# Patient Record
Sex: FEMALE | Race: NATIVE HAWAIIAN OR OTHER PACIFIC ISLANDER | HISPANIC OR LATINO | Employment: UNEMPLOYED | URBAN - METROPOLITAN AREA
[De-identification: names, ages, dates, MRNs, and addresses within clinical notes are randomized per-mention and may not be internally consistent; named-entity substitution may affect disease eponyms.]

---

## 2017-06-24 ENCOUNTER — APPOINTMENT (OUTPATIENT)
Dept: RADIOLOGY | Facility: MEDICAL CENTER | Age: 47
End: 2017-06-24
Attending: EMERGENCY MEDICINE
Payer: OTHER MISCELLANEOUS

## 2017-06-24 ENCOUNTER — HOSPITAL ENCOUNTER (EMERGENCY)
Facility: MEDICAL CENTER | Age: 47
End: 2017-06-24
Attending: EMERGENCY MEDICINE
Payer: OTHER MISCELLANEOUS

## 2017-06-24 VITALS
HEART RATE: 90 BPM | RESPIRATION RATE: 16 BRPM | SYSTOLIC BLOOD PRESSURE: 187 MMHG | TEMPERATURE: 98.2 F | DIASTOLIC BLOOD PRESSURE: 101 MMHG | HEIGHT: 63 IN | BODY MASS INDEX: 44.3 KG/M2 | WEIGHT: 250 LBS | OXYGEN SATURATION: 96 %

## 2017-06-24 DIAGNOSIS — T14.90XA BLUNT TRAUMA: ICD-10-CM

## 2017-06-24 DIAGNOSIS — N28.89 RENAL MASS: ICD-10-CM

## 2017-06-24 DIAGNOSIS — S90.01XA CONTUSION OF RIGHT ANKLE, INITIAL ENCOUNTER: ICD-10-CM

## 2017-06-24 DIAGNOSIS — S29.9XXA CHEST WALL TRAUMA, INITIAL ENCOUNTER: ICD-10-CM

## 2017-06-24 LAB
ABO GROUP BLD: NORMAL
ALBUMIN SERPL BCP-MCNC: 3.6 G/DL (ref 3.2–4.9)
ALBUMIN/GLOB SERPL: 0.9 G/DL
ALP SERPL-CCNC: 112 U/L (ref 30–99)
ALT SERPL-CCNC: 25 U/L (ref 2–50)
ANION GAP SERPL CALC-SCNC: 8 MMOL/L (ref 0–11.9)
AST SERPL-CCNC: 19 U/L (ref 12–45)
BILIRUB SERPL-MCNC: 0.4 MG/DL (ref 0.1–1.5)
BLD GP AB SCN SERPL QL: NORMAL
BUN SERPL-MCNC: 10 MG/DL (ref 8–22)
CALCIUM SERPL-MCNC: 8.9 MG/DL (ref 8.5–10.5)
CHLORIDE SERPL-SCNC: 105 MMOL/L (ref 96–112)
CO2 SERPL-SCNC: 21 MMOL/L (ref 20–33)
CREAT SERPL-MCNC: 0.64 MG/DL (ref 0.5–1.4)
ERYTHROCYTE [DISTWIDTH] IN BLOOD BY AUTOMATED COUNT: 43.6 FL (ref 35.9–50)
ETHANOL BLD-MCNC: 0 G/DL
GFR SERPL CREATININE-BSD FRML MDRD: >60 ML/MIN/1.73 M 2
GLOBULIN SER CALC-MCNC: 4 G/DL (ref 1.9–3.5)
GLUCOSE SERPL-MCNC: 189 MG/DL (ref 65–99)
HCG SERPL QL: NEGATIVE
HCT VFR BLD AUTO: 31.7 % (ref 37–47)
HGB BLD-MCNC: 9.7 G/DL (ref 12–16)
MCH RBC QN AUTO: 23.9 PG (ref 27–33)
MCHC RBC AUTO-ENTMCNC: 30.6 G/DL (ref 33.6–35)
MCV RBC AUTO: 78.1 FL (ref 81.4–97.8)
PLATELET # BLD AUTO: 274 K/UL (ref 164–446)
PMV BLD AUTO: 8.8 FL (ref 9–12.9)
POTASSIUM SERPL-SCNC: 3.9 MMOL/L (ref 3.6–5.5)
PROT SERPL-MCNC: 7.6 G/DL (ref 6–8.2)
RBC # BLD AUTO: 4.06 M/UL (ref 4.2–5.4)
RH BLD: NORMAL
SODIUM SERPL-SCNC: 134 MMOL/L (ref 135–145)
WBC # BLD AUTO: 6.7 K/UL (ref 4.8–10.8)

## 2017-06-24 PROCEDURE — 80307 DRUG TEST PRSMV CHEM ANLYZR: CPT

## 2017-06-24 PROCEDURE — 96374 THER/PROPH/DIAG INJ IV PUSH: CPT

## 2017-06-24 PROCEDURE — 86850 RBC ANTIBODY SCREEN: CPT

## 2017-06-24 PROCEDURE — 305948 HCHG GREEN TRAUMA ACT PRE-NOTIFY NO CC

## 2017-06-24 PROCEDURE — 72125 CT NECK SPINE W/O DYE: CPT

## 2017-06-24 PROCEDURE — 80053 COMPREHEN METABOLIC PANEL: CPT

## 2017-06-24 PROCEDURE — 700111 HCHG RX REV CODE 636 W/ 250 OVERRIDE (IP): Performed by: PHYSICIAN ASSISTANT

## 2017-06-24 PROCEDURE — 99285 EMERGENCY DEPT VISIT HI MDM: CPT

## 2017-06-24 PROCEDURE — 85027 COMPLETE CBC AUTOMATED: CPT

## 2017-06-24 PROCEDURE — 96375 TX/PRO/DX INJ NEW DRUG ADDON: CPT

## 2017-06-24 PROCEDURE — 70450 CT HEAD/BRAIN W/O DYE: CPT

## 2017-06-24 PROCEDURE — 72128 CT CHEST SPINE W/O DYE: CPT

## 2017-06-24 PROCEDURE — 73600 X-RAY EXAM OF ANKLE: CPT | Mod: RT

## 2017-06-24 PROCEDURE — 86900 BLOOD TYPING SEROLOGIC ABO: CPT

## 2017-06-24 PROCEDURE — 84703 CHORIONIC GONADOTROPIN ASSAY: CPT

## 2017-06-24 PROCEDURE — 71260 CT THORAX DX C+: CPT

## 2017-06-24 PROCEDURE — 86901 BLOOD TYPING SEROLOGIC RH(D): CPT

## 2017-06-24 PROCEDURE — 700117 HCHG RX CONTRAST REV CODE 255: Performed by: EMERGENCY MEDICINE

## 2017-06-24 PROCEDURE — 72131 CT LUMBAR SPINE W/O DYE: CPT

## 2017-06-24 PROCEDURE — 71010 DX-CHEST-LIMITED (1 VIEW): CPT

## 2017-06-24 RX ORDER — MORPHINE SULFATE 4 MG/ML
INJECTION, SOLUTION INTRAMUSCULAR; INTRAVENOUS
Status: COMPLETED | OUTPATIENT
Start: 2017-06-24 | End: 2017-06-24

## 2017-06-24 RX ORDER — ONDANSETRON 2 MG/ML
INJECTION INTRAMUSCULAR; INTRAVENOUS
Status: COMPLETED | OUTPATIENT
Start: 2017-06-24 | End: 2017-06-24

## 2017-06-24 RX ADMIN — MORPHINE SULFATE 4 MG: 4 INJECTION INTRAVENOUS at 11:16

## 2017-06-24 RX ADMIN — ONDANSETRON 4 MG: 2 INJECTION INTRAMUSCULAR; INTRAVENOUS at 11:17

## 2017-06-24 RX ADMIN — IOHEXOL 100 ML: 350 INJECTION, SOLUTION INTRAVENOUS at 11:49

## 2017-06-24 ASSESSMENT — PAIN SCALES - GENERAL: PAINLEVEL_OUTOF10: 3

## 2017-06-24 NOTE — ED AVS SNAPSHOT
Utilize Health Access Code: [email or web address]  Expires: 7/24/2017 12:38 PM    Your email address is not on file at [email or web address].  Email Addresses are required for you to sign up for Utilize Health, please contact [phone] to verify your personal information and to provide your email address prior to attempting to register for Utilize Health.    [facility]  [address] [place] [address]    [employer]  A secure, online tool to manage your health information     [email or web address]’s Utilize Health® is a secure, online tool that connects you to your personalized health information from the privacy of your home -- day or night - making it very easy for you to manage your healthcare. Once the activation process is completed, you can even access your medical information using the Utilize Health jose r, which is available for free in the Apple Jose R store or Google Play store.     To learn more about Utilize Health, visit [email or web address]    There are two levels of access available (as shown below):   My Chart Features  [facility] Primary Care Doctor [facility]  Specialists [facility]  Urgent  Care Non-[facility] Primary Care Doctor   Email your healthcare team securely and privately 24/7 X X X    Manage appointments: schedule your next appointment; view details of past/upcoming appointments X      Request prescription refills. X      View recent personal medical records, including lab and immunizations X X X X   View health record, including health history, allergies, medications X X X X   Read reports about your outpatient visits, procedures, consult and ER notes X X X X   See your discharge summary, which is a recap of your hospital and/or ER visit that includes your diagnosis, lab results, and care plan X X  X     How to register for [employer]:  Once your e-mail address has been verified, follow the following steps to sign up for Utilize Health.     1. Go to  [email or web address]  2. Click on the Sign Up Now box, which takes you to the New Member Sign Up page. You will need  to provide the following information:  a. Enter your [employer] Access Code exactly as it appears at the top of this page. (You will not need to use this code after you’ve completed the sign-up process. If you do not sign up before the expiration date, you must request a new code.)   b. Enter your date of birth.   c. Enter your home email address.   d. Click Submit, and follow the next screen’s instructions.  3. Create a [employer] ID. This will be your [employer] login ID and cannot be changed, so think of one that is secure and easy to remember.  4. Create a [employer] password. You can change your password at any time.  5. Enter your Password Reset Question and Answer. This can be used at a later time if you forget your password.   6. Enter your e-mail address. This allows you to receive e-mail notifications when new information is available in [employer].  7. Click Sign Up. You can now view your health information.    For assistance activating your [employer] account, call [phone]
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
## 2017-06-24 NOTE — ED NOTES
Spinal precautions discontinued per ERP.  C-collar removed.  Patient repositioned for comfort.  Family remains at bedside.  Updated on POC.
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
## 2017-06-24 NOTE — DISCHARGE INSTRUCTIONS
You'll need to follow up with urology for further evaluation and management of your kidney mass as this may be cancer.    Masa renal  (Renal Mass)  Tarik masa renal es un tumor en el riñón. Algunas masas tumorales son dañinas y pueden provocar cáncer. Otras son inofensivas. Tarik masa renal puede ser sólida o estar llena de líquido. Las masas tumorales que están llenas de líquido se denominan quistes.  CAUSAS  Por lo general, se desconoce la causa de tarik masa renal. Sin embargo, ciertos tipos de cáncer e infección pueden originar tarik masa renal.   SIGNOS Y SÍNTOMAS  Entre los síntomas se pueden incluir los siguientes:  · Chon en la orina.  · Dolor en el costado del cuerpo o en la espalda (dolor en la fosa lumbar).  · Sensación de saciedad inmediatamente después de comer.  · Pérdida de peso.  · Hinchazón del abdomen.  Algunas masas renales no causan síntomas.  DIAGNÓSTICO  Tarik masa renal puede detectarse con tarik TC, tarik ecografía o tarik RM del abdomen.   TRATAMIENTO  El tratamiento dependerá del tipo de masa renal.  · Si la masa renal es un quiste que no causa problemas, no requerirá tratamiento.  · Si la masa renal es un quiste que causa problemas, es posible que deba drenarse hans un procedimiento denominado cirugía laparoscópica.  · Si la masa renal es sólida, es posible que deba extirparse mediante tarik cirugía de abdomen.  · Si la masa renal es causada por un cáncer de riñón, es posible que se requiera tarik cirugía para extirpar todo o parte del riñón.  Debe consultar a cox médico tarik o dos veces al año para que le realicen tomografías computarizadas o ecografías. En estos estudios, cox médico podrá jose si la masa renal se ha modificado o agrandado.  INSTRUCCIONES PARA EL CUIDADO EN EL HOGAR  Lo que deba hacer en el hogar dependerá del tipo de masa renal que tenga. El tratamiento que realizó también establecerá tarik diferencia. Siga las indicaciones del médico. En general:  · Concurra a todas las visitas de control  jaison se lo haya indicado el médico.  · Curtiss los medicamentos solamente jaison se lo haya indicado el médico.  SOLICITE ATENCIÓN MÉDICA SI:  · Siente dolor abdominal.  · Siente dolor en la fosa lumbar.  · Tiene fiebre.  SOLICITE ATENCIÓN MÉDICA DE INMEDIATO SI:   · El dolor empeora.  · Hay nasreen en la orina.  · No puede orinar.  · Siente dolor en el pecho.  · Tiene dificultad para respirar.  ASEGÚRESE DE QUE:  · Comprende estas instrucciones.  · Controlará cox afección.  · Recibirá ayuda de inmediato si no mejora o si empeora.     Esta información no tiene jaison fin reemplazar el consejo del médico. Asegúrese de hacerle al médico cualquier pregunta que tenga.     Document Released: 07/15/2015  [employer] Interactive Patient Education ©2016 [employer]  Traumatismo contuso  (Blunt Trauma)  Usted ha sido evaluado por carlene lesiones. Fue examinado y el profesional que lo asiste no encontró lesiones lo suficientemente graves jaison para que requiera hospitalización.  Luego de un accidente, es común presentar múltiples magullones y burton musculares. Estos tienden a aumentar hans las primeras 24 horas, con más rigidez y dolor en las horas siguientes, que empeorarán cuando se levante de la cama la primera mañana luego del accidente. A partir de allí, debería comenzar a mejorar cada día que pase. El nivel de mejoría generalmente depende de la importancia de las lesiones sufridas en el accidente.  Luego del accidente, si alguna parte de cox cuerpo no responde jaison debería, o si el dolor en alguna jeannie se incrementa, debe regresar al Servicio de Emergencias para que lo examinen nuevamente.   INSTRUCCIONES PARA EL CUIDADO DOMICILIARIO  Los cuidados de rutina para las zonas doloridas deben incluir:  · Hielo en las zonas doloridas cada 2 horas hans 20 minutos mientras está despierto hans los próximos 2 días.  · Beber líquidos en abundancia (excluya el alcohol).  · Darse tarik ducha caliente o tibia o deepak un baño tarik o dos  veces por día para aumentar el flujo sanguíneo en los músculos doloridos. [place] lo ayudará a recuperar la agilidad.  · Actividades según las tolere. Levantar pesos puede agravar el dolor de richard o espalda.  · Utilice los medicamentos de venta amber o de prescripción para el dolor, el malestar o la fiebre, según se lo indique el profesional que lo asiste. No tome aspirina. Podrían aumentar los hematomas o las hemorragias en tee de que existan pequeñas zonas en las que esto ocurre.  SOLICITE ATENCIÓN MÉDICA DE INMEDIATO SI OBSERVA:  · Entumecimiento, hormigueo, debilidad o problemas con el uso de los brazos o las piernas.  · Dolor de anabela intenso que no mejora con medicamentos.  · Cambios en el control del intestino o la vejiga.  · Aumento del dolor en otras partes del cuerpo.  · Dificultades respiratorias, mareos.  · Náuseas, vómitos o sudoración.  · Aumento del malestar abdominal (en el vientre).  · Nasreen en la orina, en las heces o vómitos con nasreen.  · Dolor en los hombros en el área donde se ubicarían los tirantes de tarik prenda.  · Sensación de mareos o si ha sufrido un episodio de desmayo.  En algunos casos no es posible identificar todas las lesiones inmediatamente después del traumatismo. Es importante que siga controlando cox enfermedad después de la visita al servicio de emergencias. Si siente que no mejora, o mejora más lentamente que lo que debería esperarse, llame a cox médico. Si siente que carlene síntomas (problemas) empeoran, vuelva al Servicio de Emergencias inmediatamente.  Document Released: 12/18/2006 Document Revised: 03/11/2013  ExitCare® Patient Information ©2014 [employer], [employer].  Esguince de tobillo  (Ankle Sprain)   Un esguince de tobillo es tarik lesión en los tejidos priscila y fibrosos (ligamentos) que mantienen unidos los huesos de la articulación del tobillo.   CAUSAS   Las causas pueden ser tarik caída o la torcedura del tobillo. Los esguinces de tobillo ocurren con más frecuencia al pisar con  el borde exterior del pie, lo que hace que el tobillo se vuelva hacia adentro. Las personas que practican deportes son más propensas a belen tipo de lesiones.   SÍNTOMAS   · Dolor en el tobillo. El dolor puede aparecer hans el reposo o sólo al tratar de ponerse de pie o caminar.  · Hinchazón.  · Hematomas. Los hematomas pueden aparecer inmediatamente o luego de 1 a 2 días después de la lesión.  · Dificultad para pararse o caminar, especialmente al doblar en esquinas o al cambiar de dirección.  DIAGNÓSTICO   El médico le preguntará detalles acerca de la lesión y le hará un examen físico del tobillo para determinar si tiene un esguince. Hans el examen físico, el médico apretará y aplicará presión en áreas específicas del pie y del tobillo. El médico tratará de  el tobillo en ciertas direcciones. Le indicarán tarik radiografía para descartar la fractura de un hueso o que un ligamento no se haya separado de michelle de los huesos del tobillo (fractura por avulsión).   TRATAMIENTO   Algunos tipos de soporte podrán ayudarlo a estabilizar el tobillo. El profesional que lo asiste le dará las indicaciones. También podrá indicarle que use medicamentos para calmar el dolor. Si el esguince es grave, cox médico podrá derivarlo a un cirujano que lo ayudará a recuperar la función de las partes afectadas del sistema esquelético (ortopedista) o a un fisioterapeuta.   INSTRUCCIONES PARA EL CUIDADO EN EL HOGAR   · Aplique hielo en la articulación lesionada hans 1 ó 2 días o según lo que le indique cox médico. La aplicación del hielo ayuda a reducir la inflamación y el dolor.  ¨ Ponga el hielo en tarik bolsa plástica.  ¨ Colóquese tarik toalla entre la piel y la bolsa de hielo.  ¨ Deje el hielo en el lugar hans 15 a 20 minutos por vez, cada 2 horas mientras esté despierto.  · Sólo tome medicamentos de venta amber o recetados para calmar el dolor, las molestias o bajar la fiebre según las indicaciones de cox médico.  · Eleve el  tobillo lesionado por encima del nivel del corazón tanto jaison pueda hans 2 o 3 días.  · Si cox médico le indica el uso de muletas, úselas según las instrucciones. Gradualmente lleve el peso sobre el tobillo afectado. Siga usando muletas o un bastón hasta que pueda caminar sin sentir dolor en el tobillo.  · Si tiene tarik férula de yeso, úsela jaison lo indique cox médico. No se apoye en ninguna cosa más dura que tarik almohada hans las primeras 24 horas. No ponga peso sobre la férula. No permita que se moje. Puede quitársela para deepak tarik ducha o un baño.  · Pueden haberle colocado un vendaje elástico para usar alrededor del tobillo para darle soporte. Si el vendaje elástico está muy ajustado (siente adormecimiento u hormigueo o el pie está frío y heather), ajústelo para que sea más cómodo.  · Si usted tiene tarik férula de aire, puede soplar o dejar salir el aire para que sea más cómodo. Puede quitarse la férula por la noche y antes de deepak tarik ducha o un baño. Mueva los dedos de los pies en la férula varias veces al día para disminuir la hinchazón.  SOLICITE ATENCIÓN MÉDICA SI:   · Le aumenta rápidamente el moretón o el hinchazón.  · Los dedos de los pies están extremadamente fríos o pierde la sensibilidad en el pie.  · El dolor no se joss con los medicamentos.  SOLICITE ATENCIÓN MÉDICA DE INMEDIATO SI:   · Los dedos de los pies están adormecidos o de color heather.  · Tiene un dolor dior que va aumentando.  ASEGÚRESE DE QUE:   · Comprende estas instrucciones.  · Controlará cox enfermedad.  · Solicitará ayuda de inmediato si no mejora o empeora.     Esta información no tiene jaison fin reemplazar el consejo del médico. Asegúrese de hacerle al médico cualquier pregunta que tenga.     Document Released: 12/18/2006 Document Revised: 09/11/2013  Elsevier Interactive Patient Education ©2016 [employer]vier Inc.

## 2017-06-24 NOTE — ED AVS SNAPSHOT
6/24/2017    Every Eleven  [address] [place] [address]    Dear Every:    [facility] wants to ensure your discharge home is safe and you or your loved ones have had all of your questions answered regarding your care after you leave the hospital.    Below is a list of resources and contact information should you have any questions regarding your hospital stay, follow-up instructions, or active medical symptoms.    Questions or Concerns Regarding… Contact   Medical Questions Related to Your Discharge  (7 days a week, 8am-5pm) Contact a Nurse Care Coordinator   [phone]   Medical Questions Not Related to Your Discharge  (24 hours a day / 7 days a week)  Contact the Nurse Health Line   [phone]    Medications or Discharge Instructions Refer to your discharge packet   or contact your [facility] Primary Care Provider   [phone]   Follow-up Appointment(s) Schedule your appointment via On[employer]   or contact Scheduling [phone]   Billing Review your statement via Ondax  or contact Billing [phone]   Medical Records Review your records via Ondax   or contact Medical Records [phone]     You may receive a telephone call within two days of discharge. This call is to make certain you understand your discharge instructions and have the opportunity to have any questions answered. You can also easily access your medical information, test results and upcoming appointments via the Ondax free online health management tool. You can learn more and sign up at [employer]ndax. For assistance setting up your Ondax account, please call [phone].    Once again, we want to ensure your discharge home is safe and that you have a clear understanding of any next steps in your care. If you have any questions or concerns, please do not hesitate to contact us, we are here for you. Thank you for choosing [facility] for your healthcare needs.    Sincerely,    Your [facility] Healthcare Team

## 2017-06-24 NOTE — ED NOTES
ERP at bedside for re-eval.  Patient and family member given discharge instructions and follow up information, declined use of language line video , family translating.  PIV removed.

## 2017-11-15 ENCOUNTER — OFFICE VISIT (OUTPATIENT)
Dept: INTERNAL MEDICINE | Facility: MEDICAL CENTER | Age: 47
End: 2017-11-15
Payer: COMMERCIAL

## 2017-11-15 VITALS
BODY MASS INDEX: 43.02 KG/M2 | HEART RATE: 92 BPM | SYSTOLIC BLOOD PRESSURE: 179 MMHG | TEMPERATURE: 97.6 F | HEIGHT: 64 IN | OXYGEN SATURATION: 96 % | WEIGHT: 252 LBS | DIASTOLIC BLOOD PRESSURE: 79 MMHG

## 2017-11-15 DIAGNOSIS — I10 HYPERTENSION, UNSPECIFIED TYPE: ICD-10-CM

## 2017-11-15 DIAGNOSIS — N28.89 RENAL MASS, RIGHT: ICD-10-CM

## 2017-11-15 DIAGNOSIS — Z00.00 HEALTH CARE MAINTENANCE: ICD-10-CM

## 2017-11-15 DIAGNOSIS — D64.9 ANEMIA, UNSPECIFIED TYPE: ICD-10-CM

## 2017-11-15 PROCEDURE — 90471 IMMUNIZATION ADMIN: CPT | Performed by: INTERNAL MEDICINE

## 2017-11-15 PROCEDURE — 99204 OFFICE O/P NEW MOD 45 MIN: CPT | Mod: 25,GC | Performed by: INTERNAL MEDICINE

## 2017-11-15 PROCEDURE — 90715 TDAP VACCINE 7 YRS/> IM: CPT | Performed by: INTERNAL MEDICINE

## 2017-11-15 RX ORDER — CHLORTHALIDONE 25 MG/1
25 TABLET ORAL DAILY
Qty: 30 TAB | Refills: 3 | Status: SHIPPED | OUTPATIENT
Start: 2017-11-15 | End: 2017-12-05 | Stop reason: SDUPTHER

## 2017-11-15 RX ORDER — CLONIDINE HYDROCHLORIDE 0.1 MG/1
0.1 TABLET ORAL 3 TIMES DAILY
Qty: 90 TAB | Refills: 3 | Status: SHIPPED | OUTPATIENT
Start: 2017-11-15 | End: 2018-01-10

## 2017-11-15 ASSESSMENT — ENCOUNTER SYMPTOMS
VOMITING: 0
PND: 0
INSOMNIA: 0
HEADACHES: 1
FALLS: 0
DIZZINESS: 0
SPEECH CHANGE: 0
LOSS OF CONSCIOUSNESS: 0
WEAKNESS: 0
DOUBLE VISION: 0
NAUSEA: 0
HEMOPTYSIS: 0
BLURRED VISION: 1
WEIGHT LOSS: 0
TREMORS: 0
SORE THROAT: 0
ABDOMINAL PAIN: 0
SPUTUM PRODUCTION: 0
DEPRESSION: 0
HEARTBURN: 0
FEVER: 0
DIARRHEA: 0
NERVOUS/ANXIOUS: 1
PHOTOPHOBIA: 0
BRUISES/BLEEDS EASILY: 0
WHEEZING: 0
CHILLS: 0
SENSORY CHANGE: 0
EYE REDNESS: 0
EYE PAIN: 0
EYE DISCHARGE: 0
CLAUDICATION: 0
ORTHOPNEA: 0
BACK PAIN: 0
POLYDIPSIA: 0
FOCAL WEAKNESS: 0
FLANK PAIN: 0
STRIDOR: 0
PALPITATIONS: 0
DIAPHORESIS: 0
CONSTIPATION: 0
MYALGIAS: 0
NECK PAIN: 1
SHORTNESS OF BREATH: 0
HALLUCINATIONS: 0
COUGH: 0
SEIZURES: 0
BLOOD IN STOOL: 0
TINGLING: 0

## 2017-11-15 ASSESSMENT — PATIENT HEALTH QUESTIONNAIRE - PHQ9
5. POOR APPETITE OR OVEREATING: 3 - NEARLY EVERY DAY
CLINICAL INTERPRETATION OF PHQ2 SCORE: 6
SUM OF ALL RESPONSES TO PHQ QUESTIONS 1-9: 12

## 2017-11-15 ASSESSMENT — LIFESTYLE VARIABLES: SUBSTANCE_ABUSE: 0

## 2017-11-15 NOTE — PROGRESS NOTES
"Opal Coto is a 47 y.o. female here for a non-provider visit for:   TDAP    Reason for immunization: Overdue/Provider Recommended  Immunization records indicate need for vaccine: Yes, confirmed with Epic  Minimum interval has been met for this vaccine: Yes  ABN completed: Not Indicated    Order and dose verified by: Junior DE LA CRUZ Dated  2/24/2015 was given to patient: Yes  All IAC Questionnaire questions were answered \"No.\"    Patient tolerated injection and no adverse effects were observed or reported: Yes    Pt scheduled for next dose in series: Not Indicated    "
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
## 2017-11-15 NOTE — PATIENT INSTRUCTIONS
- please call Urology to schedule appointment  - please record the blood pressure 2 times a day    - please do the labs   - please  the new meds from the pharmacy   - follow up within 2 weeks  Anemia inespecífica  (Anemia, Nonspecific)  La anemia es tarik enfermedad en la que la concentración de glóbulos rojos o el nivel de hemoglobina en la nasreen están por debajo de lo normal. La hemoglobina es la sustancia de los glóbulos rojos que lleva el oxígeno a todo el cuerpo. La anemia da jaison resultado que los tejidos no reciban la cantidad suficiente de oxígeno.   CAUSAS   Las causas más frecuentes de anemia son:   · Sangrado excesivo. El sangrado puede ser interno o externo. Incluye sangrado excesivo debido al período (en las mujeres) o por los intestinos.    · Déficit nutricional.    · Enfermedad renal, tiroidea o hepática crónicas.   · Enfermedades de la médula ósea que disminuyen la producción de glóbulos rojos.  · Cáncer y tratamientos para el cáncer.  · VIH, sida y carlene tratamientos.  · Trastornos del bazo que aumentan la destrucción de glóbulos rojos.  · Enfermedades de la nasreen.  · Destrucción excesiva de glóbulos rojos debido a tarik infección, a medicamentos y a enfermedades autoinmunes.  SIGNOS Y SÍNTOMAS   · Debilidad leve.    · Mareos.    · Dolor de anabela.  · Palpitaciones.    · Falta de aire, especialmente con el ejercicio.    · Palidez.  · Sensibilidad al frío.  · Indigestión.  · Náuseas.  · Dificultad para dormir.  · Dificultad para concentrarse.  Los síntomas pueden ocurrir repentinamente o pueden desarrollarse lentamente.   DIAGNÓSTICO   Con frecuencia es necesario realizar análisis de nasreen adicionales. Estos ayudan al profesional a determinar el mejor tratamiento. Hartman médico controlará la materia fecal para detectar la presencia de nasreen y buscar otras causas de pérdida de nasreen.   TRATAMIENTO   El tratamiento varía según la causa de la anemia. Las opciones de tratamiento son:   · Suplementos de  lila, vitamina B12, o ácido fólico.    · Medicamentos con hormonas.    · Transfusión de nasreen. Será necesaria en los casos de pérdida de nasreen grave.    · Hospitalización. Será necesaria si la pérdida de nasreen es continua y significativa.    · Cambios en la dieta.  · Extirpación del bazo.  INSTRUCCIONES PARA EL CUIDADO EN EL HOGAR  Cumpla con todas las visitas de control. Generalmente demora varias semanas corregir la anemia, y es muy importante que el médico controle cox enfermedad y cox respuesta al tratamiento.  SOLICITE ATENCIÓN MÉDICA DE INMEDIATO SI:   · Siente debilidad extrema, falta de aire o dolor en el pecho.    · Se siente mareado o tiene dificultad para concentrarse.  · Tiene tarik hemorragia vaginal abundante.    · Aparece tarik erupción cutánea.    · La materia fecal es parul, de aspecto alquitranado.    · Se desmaya.    · Vomita nasreen.    · Vomita repetidas veces.    · Siente dolor abdominal.  · Tiene fiebre o síntomas persistentes hans más de 2 - 3 nikos.    · Tiene fiebre y los síntomas empeoran repentinamente.    · Se deshidrata.    ASEGÚRESE DE QUE:  · Comprende estas instrucciones.  · Controlará cox afección.  · Recibirá ayuda de inmediato si no mejora o si empeora.     Esta información no tiene jaison fin reemplazar el consejo del médico. Asegúrese de hacerle al médico cualquier pregunta que tenga.     Document Released: 12/18/2006 Document Revised: 08/20/2014  Elsevier Interactive Patient Education ©2016 [employer]    Hipertensión  (Hypertension)  Cuando el corazón late bombea la nasreen a través de las arterias. La fuerza que se origina es la presión arterial. Si la presión es demasiado elevada, se denomina hipertensión. El peligro radica en que puede sufrirla y no saberlo. Hipertensión puede significar que cox corazón debe trabajar más intensamente para bombear nasreen. Las arterias pueden estar estrechas o rígidas. El trabajo extra aumenta el riesgo de enfermedades cardíacas, ictus y otros  "problemas.   La presión arterial está formada por dos números: el número mayor sobre el número zeferino, por [id number]. Se señala \"110/72\". Los valores ideales son por debajo de 120 para el número más alto (sistólica) y por debajo de 80 para el más bajo (diastólica). Anote cox presión sanguínea hoy.  Debe prestar mucha atención a cox presión arterial si sufre alguna otra enfermedad jaison:  · Insuficiencia cardíaca  · Ataques cardiacos previos  · Diabetes  · Enfermedad renal crónica  · Ictus previo  · Múltiples factores de riesgo para enfermedades cardíacas  Para diagnosticar si usted sufre hipertensión arterial, debe medirse la presión mientras encuentra sentado con el brazo elevado a la altura del nivel del corazón. Debe medirse al menos 2 veces. Tarik única lectura de presión arterial elevada (especialmente en el servicio de emergencias) no significa que necesita tratamiento. Hay enfermedades en las que la presión arterial es diferente en ambos brazos. Es importante que consulte rápidamente con cox médico para un control.  La mayoría de las personas sufren hipertensión esencial, lo que significa que no tiene tarik causa específica. Flory tipo de hipertensión puede bajarse modificando algunos factores en el estilo de trinidad jaison:  · Estrés.  · El consumo de cigarrillos.  · La falta de actividad física.  · Peso excesivo  · Consumo de drogas y alcohol.  · Consumiendo menos sal  La mayoría de las personas no tienen síntomas hasta que la hipertensión ocasiona un daño en el organismo. El tratamiento efectivo puede evitar, demorar o reducir bre daño.  TRATAMIENTO:  El tratamiento para la hipertensión, cuando se ha identificado tarik causa, está dirigido a la misma Hay un gran número en medicamentos para tratarla. Se agrupan en diferentes categorías y el médico seleccionará los medicamentos indicados para usted. Muchos medicamentos disponibles tienen efectos secundarios. Debe comentar los efectos secundarios con cox médico.  Si la " presión arterial permanece elevada después de modificar cox estilo de trinidad o comenzar a lucía medicamentos:  · Los medicamentos deben ser reemplazados  · Puede ser necesario evaluar otros problemas.  · Debe estar seguro que comprende las indicaciones, que sabe cómo y cuándo lucía los medicamentos.  · Asegúrese de realizar un control con cox médico dentro del tiempo indicado (generalmente dentro de las dos semanas) para volver a evaluar la presión arterial y revisar los medicamentos prescritos.  · Si está tomando más de un medicamento para la presión arterial, asegúrese que sabe cómo y en qué momentos debe tomarlos. Lucía los medicamentos al mismo tiempo puede linda jaison resultado un gran descenso en la presión arterial.  SOLICITE ATENCIÓN MÉDICA DE INMEDIATO SI PRESENTA:  · Dolor de anabela intenso, visión borrosa o cambios en la visión, o confusión.  · Debilidad o adormecimientos inusuales o sensación de desmayo.  · Dolor de pecho o abdominal intenso, vómitos o problemas para respirar.  ASEGÚRESE QUE:   · Comprende estas instrucciones.  · Controlará cox enfermedad.  · Solicitará ayuda inmediatamente si no mejora o si empeora.  Document Released: 12/18/2006 Document Revised: 03/11/2013  [employer]® Patient Information ©2014 [employer].

## 2017-11-16 ENCOUNTER — HOSPITAL ENCOUNTER (OUTPATIENT)
Dept: LAB | Facility: MEDICAL CENTER | Age: 47
End: 2017-11-16
Attending: STUDENT IN AN ORGANIZED HEALTH CARE EDUCATION/TRAINING PROGRAM
Payer: COMMERCIAL

## 2017-11-16 DIAGNOSIS — N28.89 RENAL MASS, RIGHT: ICD-10-CM

## 2017-11-16 DIAGNOSIS — D64.9 ANEMIA, UNSPECIFIED TYPE: ICD-10-CM

## 2017-11-16 DIAGNOSIS — Z00.00 HEALTH CARE MAINTENANCE: ICD-10-CM

## 2017-11-16 DIAGNOSIS — I10 HYPERTENSION, UNSPECIFIED TYPE: ICD-10-CM

## 2017-11-16 LAB
25(OH)D3 SERPL-MCNC: 7 NG/ML (ref 30–100)
ALBUMIN SERPL BCP-MCNC: 3.6 G/DL (ref 3.2–4.9)
ALBUMIN/GLOB SERPL: 0.9 G/DL
ALP SERPL-CCNC: 103 U/L (ref 30–99)
ALT SERPL-CCNC: 23 U/L (ref 2–50)
ANION GAP SERPL CALC-SCNC: 8 MMOL/L (ref 0–11.9)
ANISOCYTOSIS BLD QL SMEAR: ABNORMAL
APPEARANCE UR: CLEAR
AST SERPL-CCNC: 22 U/L (ref 12–45)
BACTERIA #/AREA URNS HPF: ABNORMAL /HPF
BASOPHILS # BLD AUTO: 0.2 % (ref 0–1.8)
BASOPHILS # BLD: 0.01 K/UL (ref 0–0.12)
BILIRUB SERPL-MCNC: 0.4 MG/DL (ref 0.1–1.5)
BILIRUB UR QL STRIP.AUTO: NEGATIVE
BUN SERPL-MCNC: 10 MG/DL (ref 8–22)
CALCIUM SERPL-MCNC: 9.1 MG/DL (ref 8.5–10.5)
CHLORIDE SERPL-SCNC: 104 MMOL/L (ref 96–112)
CHOLEST SERPL-MCNC: 137 MG/DL (ref 100–199)
CO2 SERPL-SCNC: 24 MMOL/L (ref 20–33)
COLOR UR: YELLOW
COMMENT 1642: NORMAL
CORTIS SERPL-MCNC: 21.5 UG/DL (ref 0–23)
CREAT SERPL-MCNC: 0.68 MG/DL (ref 0.5–1.4)
EOSINOPHIL # BLD AUTO: 0.06 K/UL (ref 0–0.51)
EOSINOPHIL NFR BLD: 0.9 % (ref 0–6.9)
EPI CELLS #/AREA URNS HPF: ABNORMAL /HPF
ERYTHROCYTE [DISTWIDTH] IN BLOOD BY AUTOMATED COUNT: 45 FL (ref 35.9–50)
EST. AVERAGE GLUCOSE BLD GHB EST-MCNC: 174 MG/DL
GFR SERPL CREATININE-BSD FRML MDRD: >60 ML/MIN/1.73 M 2
GLOBULIN SER CALC-MCNC: 4 G/DL (ref 1.9–3.5)
GLUCOSE SERPL-MCNC: 184 MG/DL (ref 65–99)
GLUCOSE UR STRIP.AUTO-MCNC: NEGATIVE MG/DL
HBA1C MFR BLD: 7.7 % (ref 0–5.6)
HCT VFR BLD AUTO: 32.7 % (ref 37–47)
HDLC SERPL-MCNC: 27 MG/DL
HGB BLD-MCNC: 9.7 G/DL (ref 12–16)
HGB RETIC QN AUTO: 25.3 PG/CELL (ref 29–35)
HYALINE CASTS #/AREA URNS LPF: ABNORMAL /LPF
HYPOCHROMIA BLD QL SMEAR: ABNORMAL
IMM GRANULOCYTES # BLD AUTO: 0.02 K/UL (ref 0–0.11)
IMM GRANULOCYTES NFR BLD AUTO: 0.3 % (ref 0–0.9)
IMM RETICS NFR: 36 % (ref 9.3–17.4)
IRON SATN MFR SERPL: 5 % (ref 15–55)
IRON SERPL-MCNC: 26 UG/DL (ref 40–170)
KETONES UR STRIP.AUTO-MCNC: NEGATIVE MG/DL
LDLC SERPL CALC-MCNC: 73 MG/DL
LEUKOCYTE ESTERASE UR QL STRIP.AUTO: ABNORMAL
LYMPHOCYTES # BLD AUTO: 1.76 K/UL (ref 1–4.8)
LYMPHOCYTES NFR BLD: 26.4 % (ref 22–41)
MCH RBC QN AUTO: 22.9 PG (ref 27–33)
MCHC RBC AUTO-ENTMCNC: 29.7 G/DL (ref 33.6–35)
MCV RBC AUTO: 77.1 FL (ref 81.4–97.8)
MICRO URNS: ABNORMAL
MICROCYTES BLD QL SMEAR: ABNORMAL
MONOCYTES # BLD AUTO: 0.27 K/UL (ref 0–0.85)
MONOCYTES NFR BLD AUTO: 4.1 % (ref 0–13.4)
MORPHOLOGY BLD-IMP: NORMAL
NEUTROPHILS # BLD AUTO: 4.54 K/UL (ref 2–7.15)
NEUTROPHILS NFR BLD: 68.1 % (ref 44–72)
NITRITE UR QL STRIP.AUTO: NEGATIVE
NRBC # BLD AUTO: 0 K/UL
NRBC BLD AUTO-RTO: 0 /100 WBC
PH UR STRIP.AUTO: 6.5 [PH]
PLATELET # BLD AUTO: 326 K/UL (ref 164–446)
PLATELET BLD QL SMEAR: NORMAL
PMV BLD AUTO: 10.1 FL (ref 9–12.9)
POLYCHROMASIA BLD QL SMEAR: NORMAL
POTASSIUM SERPL-SCNC: 3.8 MMOL/L (ref 3.6–5.5)
PROT SERPL-MCNC: 7.6 G/DL (ref 6–8.2)
PROT UR QL STRIP: NEGATIVE MG/DL
RBC # BLD AUTO: 4.24 M/UL (ref 4.2–5.4)
RBC # URNS HPF: ABNORMAL /HPF
RBC BLD AUTO: PRESENT
RBC UR QL AUTO: ABNORMAL
RETICS # AUTO: 0.12 M/UL (ref 0.04–0.06)
RETICS/RBC NFR: 2.9 % (ref 0.8–2.1)
SODIUM SERPL-SCNC: 136 MMOL/L (ref 135–145)
SP GR UR STRIP.AUTO: 1.01
TIBC SERPL-MCNC: 473 UG/DL (ref 250–450)
TRIGL SERPL-MCNC: 186 MG/DL (ref 0–149)
TSH SERPL DL<=0.005 MIU/L-ACNC: 1.13 UIU/ML (ref 0.3–3.7)
UROBILINOGEN UR STRIP.AUTO-MCNC: 0.2 MG/DL
VIT B12 SERPL-MCNC: 963 PG/ML (ref 211–911)
WBC # BLD AUTO: 6.7 K/UL (ref 4.8–10.8)
WBC #/AREA URNS HPF: ABNORMAL /HPF

## 2017-11-16 PROCEDURE — 82384 ASSAY THREE CATECHOLAMINES: CPT

## 2017-11-16 PROCEDURE — 82533 TOTAL CORTISOL: CPT

## 2017-11-16 PROCEDURE — 80053 COMPREHEN METABOLIC PANEL: CPT

## 2017-11-16 PROCEDURE — 80061 LIPID PANEL: CPT

## 2017-11-16 PROCEDURE — 83036 HEMOGLOBIN GLYCOSYLATED A1C: CPT

## 2017-11-16 PROCEDURE — 85025 COMPLETE CBC W/AUTO DIFF WBC: CPT

## 2017-11-16 PROCEDURE — 82668 ASSAY OF ERYTHROPOIETIN: CPT

## 2017-11-16 PROCEDURE — 82607 VITAMIN B-12: CPT

## 2017-11-16 PROCEDURE — 82306 VITAMIN D 25 HYDROXY: CPT

## 2017-11-16 PROCEDURE — 36415 COLL VENOUS BLD VENIPUNCTURE: CPT

## 2017-11-16 PROCEDURE — 84443 ASSAY THYROID STIM HORMONE: CPT

## 2017-11-16 PROCEDURE — 85046 RETICYTE/HGB CONCENTRATE: CPT

## 2017-11-16 PROCEDURE — 83550 IRON BINDING TEST: CPT

## 2017-11-16 PROCEDURE — 82088 ASSAY OF ALDOSTERONE: CPT

## 2017-11-16 PROCEDURE — 83540 ASSAY OF IRON: CPT

## 2017-11-16 PROCEDURE — 81001 URINALYSIS AUTO W/SCOPE: CPT

## 2017-11-17 LAB — EPO SERPL-ACNC: 65 MU/ML (ref 4–27)

## 2017-11-18 ENCOUNTER — HOSPITAL ENCOUNTER (OUTPATIENT)
Dept: RADIOLOGY | Facility: MEDICAL CENTER | Age: 47
End: 2017-11-18
Attending: STUDENT IN AN ORGANIZED HEALTH CARE EDUCATION/TRAINING PROGRAM
Payer: COMMERCIAL

## 2017-11-18 DIAGNOSIS — D64.9 ANEMIA, UNSPECIFIED TYPE: ICD-10-CM

## 2017-11-18 DIAGNOSIS — Z12.31 VISIT FOR SCREENING MAMMOGRAM: ICD-10-CM

## 2017-11-18 PROCEDURE — G0202 SCR MAMMO BI INCL CAD: HCPCS

## 2017-11-19 LAB
DOPAMINE SERPL-MCNC: <20 PG/ML (ref 0–20)
EPINEPH PLAS-MCNC: 12 PG/ML (ref 10–200)
NOREPINEPH PLAS-MCNC: 713 PG/ML (ref 80–520)

## 2017-11-21 DIAGNOSIS — E55.9 VITAMIN D DEFICIENCY: ICD-10-CM

## 2017-11-21 PROBLEM — D50.9 IRON DEFICIENCY ANEMIA: Status: ACTIVE | Noted: 2017-11-15

## 2017-11-21 RX ORDER — LANOLIN ALCOHOL/MO/W.PET/CERES
325 CREAM (GRAM) TOPICAL
Qty: 90 TAB | Refills: 6 | Status: SHIPPED | OUTPATIENT
Start: 2017-11-21 | End: 2018-03-07

## 2017-11-21 RX ORDER — ERGOCALCIFEROL 1.25 MG/1
50000 CAPSULE ORAL
Qty: 16 CAP | Refills: 0 | Status: SHIPPED | OUTPATIENT
Start: 2017-11-21 | End: 2018-01-10

## 2017-11-22 ENCOUNTER — OFFICE VISIT (OUTPATIENT)
Dept: INTERNAL MEDICINE | Facility: MEDICAL CENTER | Age: 47
End: 2017-11-22
Payer: COMMERCIAL

## 2017-11-22 VITALS
DIASTOLIC BLOOD PRESSURE: 90 MMHG | TEMPERATURE: 97.3 F | SYSTOLIC BLOOD PRESSURE: 156 MMHG | HEIGHT: 64 IN | OXYGEN SATURATION: 99 % | BODY MASS INDEX: 41.83 KG/M2 | WEIGHT: 245 LBS | HEART RATE: 86 BPM

## 2017-11-22 DIAGNOSIS — D50.9 IRON DEFICIENCY ANEMIA, UNSPECIFIED IRON DEFICIENCY ANEMIA TYPE: ICD-10-CM

## 2017-11-22 DIAGNOSIS — G47.33 OBSTRUCTIVE SLEEP APNEA: ICD-10-CM

## 2017-11-22 DIAGNOSIS — E55.9 VITAMIN D DEFICIENCY: ICD-10-CM

## 2017-11-22 DIAGNOSIS — I15.9 SECONDARY HYPERTENSION: ICD-10-CM

## 2017-11-22 DIAGNOSIS — N28.89 RENAL MASS: ICD-10-CM

## 2017-11-22 PROCEDURE — 99214 OFFICE O/P EST MOD 30 MIN: CPT | Mod: GC | Performed by: INTERNAL MEDICINE

## 2017-11-22 RX ORDER — AMLODIPINE BESYLATE 5 MG/1
5 TABLET ORAL DAILY
Qty: 30 TAB | Refills: 3 | Status: ON HOLD | OUTPATIENT
Start: 2017-11-22 | End: 2018-01-18

## 2017-11-22 RX ORDER — BENZONATATE 100 MG/1
100 CAPSULE ORAL 3 TIMES DAILY PRN
Qty: 15 CAP | Refills: 0 | Status: SHIPPED | OUTPATIENT
Start: 2017-11-22 | End: 2018-01-10

## 2017-11-22 ASSESSMENT — PATIENT HEALTH QUESTIONNAIRE - PHQ9
5. POOR APPETITE OR OVEREATING: 3 - NEARLY EVERY DAY
SUM OF ALL RESPONSES TO PHQ QUESTIONS 1-9: 12
CLINICAL INTERPRETATION OF PHQ2 SCORE: 6

## 2017-11-22 NOTE — PROGRESS NOTES
Established Patient    Eva presents today with the following:    CC: Iron deficiency anemia. Vitamin D deficiency. Lab follow up. Cough.    HPI: 47 y.o female with renal mass that was incidentally discovered in June 2017 is here for follow up. When the mass was discovered, the patient had no insurance and was unable to get it treated. Now that the pt has access to healthcare, she is in the process of  Being evaluated and prepped for surgery. On initial evaluation by Dr. Haines, the pt was told that she needs to get her BP under control before a surgery can be performed.     At last visit, pt was started on antihypertensive medications with improvement in her BP and pt was referred for labs to further evaluate her health. On labs, the pt was found to have iron deficiency anemia, vitamin D deficiency, vitamin B12 deficiency, and borderline elevation in norepi.    Pt also presents with an acute complaint of having to cough while talking and episodes of cough when she goes to bed. She reports this has been happening for over a year now and occurs when she talks for a prolonged period of time. Of note, these symptoms started when the pt was diagnosed with MDD and started on antidepressants and gained over 50lbs of weight. On further questioning, the pt's  confirms that the pt snores at night and has episodes of apnea. The pt states that she often wakes up not feeling refreshed and needs naps during the day. Denies recent illness, fever, chills, dyspnea, chest pain, N/V, smoking, history of asthma/COPD.    Patient Active Problem List    Diagnosis Date Noted   • Obstructive sleep apnea 11/22/2017   • Renal mass 11/22/2017   • Vitamin D deficiency 11/21/2017   • Iron deficiency anemia 11/15/2017       Current Outpatient Prescriptions   Medication Sig Dispense Refill   • amlodipine (NORVASC) 5 MG Tab Take 1 Tab by mouth every day. 30 Tab 3   • benzonatate (TESSALON) 100 MG Cap Take 1 Cap by mouth 3  "times a day as needed for Cough. 15 Cap 0   • potassium bicarbonate (KLYTE) 25 MEQ tablet Take 1 Tab by mouth 2 times a day. 60 Tab 0   • vitamin D, Ergocalciferol, (DRISDOL) 47983 units Cap capsule Take 1 Cap by mouth every 7 days. 16 Cap 0   • ferrous sulfate 325 (65 Fe) MG EC tablet Take 1 Tab by mouth 3 times a day, with meals. 90 Tab 6   • clonidine (CATAPRES) 0.1 MG Tab Take 1 Tab by mouth 3 times a day. 90 Tab 3   • chlorthalidone (HYGROTON) 25 MG Tab Take 1 Tab by mouth every day. 30 Tab 3     No current facility-administered medications for this visit.        ROS: As per HPI. Additional pertinent symptoms as noted below.    All others negative    /90   Pulse 86   Temp 36.3 °C (97.3 °F)   Ht 1.626 m (5' 4\")   Wt 111.1 kg (245 lb)   SpO2 99%   BMI 42.05 kg/m²     Physical Exam   Constitutional:  oriented to person, place, and time. No distress.   Eyes: Pupils are equal, round, and reactive to light. No scleral icterus.  Throat: clear, no erythema or exudates noted.  Neck: Neck supple. No thyromegaly present.   Cardiovascular: Normal rate, regular rhythm and normal heart sounds.  Exam reveals no gallop and no friction rub.  No murmur heard.  Pulmonary/Chest: Breath sounds normal. Chest wall is not dull to percussion.   Musculoskeletal:   no edema.   Lymphadenopathy: no cervical adenopathy  Neurological: alert and oriented to person, place, and time.   Skin: No cyanosis. Nails show no clubbing.    Note: I have reviewed all pertinent labs and diagnostic tests associated with this visit with specific comments listed under the assessment and plan below    Assessment and Plan    1. Obstructive sleep apnea  Pt's symptoms of snoring, episodes of apnea, not feeling refreshed on awakening, nighttime cough and needing naps during the day are consistent with sleep apnea. As such, the patient would benefit from a sleep study and getting a CPAP machine to sleep with, especially since her BP is elevated and LESA " contributes to elevations in BP.  - Referred for sleep study  - Start Tessalon one tab at bedtime.    2. Vitamin D deficiency  Patient with vitamin D level of 7.  - Start vitamin D supplementation.    3. Iron deficiency anemia, unspecified iron deficiency anemia type  Pt is anemic with lab work consistent with iron deficiency anemia, which is not uncommon in people with mykel mass.  - Start ferrous sulfate 325 mg TID     4. Renal mass  Pt with solid mass arising from the upper pole right kidney measuring up to 6.1 cm and concerning for solid neoplasm, found incidentally on CT scan in 6/2017. She was initially seen by a Urologist, Dr. Ricky Padgett and she was told that she need to control her BP before surgery. Has no urinary symptoms.  - Pt is going to make an appointment with Dr. Gibson for follow up.  - Edenilson 24 hour urine VMA, Cat, and Met to r/o pheochromocytoma.  - Will get in contact with radiology to comment on the involvement of adrenals and if adrenals are affected, will get MRI for further investigation.  - Strict BP control with goal of < 130/80    5. Hypertension  At last visit the pt was started on Chlorthalidone and Clonidine with improvement in BP. As the pt will be going to surgery, Clonidine will be tapered off to prevent rebound HTN post op.   - Pt instructed to continue home BP log.  - Taper Clonidine (instructions provided)  - Start Amlodipine 5mg daily.  - Increase dose of Chlorthalidone to 50 mg daily and start Klor 20mEQ BID.  - BMP before next visit.      Followup: Return in about 2 weeks (around 12/6/2017).      Signed by: Evi Salazar M.D.

## 2017-11-22 NOTE — PATIENT INSTRUCTIONS
PATIENT INSTRUCTIONS  - Start taking Amlodipine once per day.  - Increase the dose of Chlorthalidone to 50mg daily.  - Start taking Potassium supplement twice per day.  - Start taking Iron supplement three times per day with food.  - start taking Vitamin D supplement two times per week for 4 weeks, then once per week for 8 weeks.  - To taper clonidine:  Take 2 tablets for 3 days, then 1 tablet for 3 days, then stop taking Clonidine.    Apnea del sueño   (Sleep Apnea)  La apnea del sueño es un trastorno caracterizado por pausas anormales en la respiración hans el sueño. Cuando la respiración se detiene, el nivel de oxígeno en la nasreen disminuye. Goodell hace que salga del sueño profundo y entre en el sueño ligero. [place] resultado, la calidad del sueño es pobre y el sistema que transporta la nasreen a través del cuerpo (sistema cardiovascular) experimenta estrés. Si la apnea del sueño no se trata, puede favorecer los siguientes problemas:   · Presión arterial elevada (hipertensión).  · Enfermedad coronaria.  · Incapacidad para alcanzar o mantener tarik erección (impotencia).  · Deterioro de los procesos de pensamiento (disfunción cognitiva).  Existen giovana tipos de apnea del sueño.   1. Apnea del sueño obstructiva - Pausas en la respiración hans el sueño debido a tarik obstrucción de las vías aéreas.  2. Apnea del sueño central -Pausas en la respiración hans el sueño debido a que la jeannie del cerebro que controla la respiración no envía las señales correctas a los músculos que la controlan.  3. Apnea del sueño mixta - Tarik combinación de ambas, tanto obstructiva jaison central.  FACTORES DE RIESGO   Sin embargo, los siguientes factores pueden aumentar el riesgo de desarrollar apnea del sueño.   · Sobrepeso.  · El hábito de fumar.  · Pasajes estrechos en la nariz y la garganta.  · Edad avanzada.  · Sexo masculino.  · El consumo de alcohol.  · Uso de sedantes y tranquilizantes.  · La etnia. Entre los individuos menores de  35 años, los afroamericanos tienen más riesgo.  SÍNTOMAS   · Dificultad para permanecer dormido.  · Somnolencia y fatiga hans el día.  · Pérdida de energía.  · Irritabilidad.  · Joon ronquidos.  · Glory de anabela matutinos.  · Dificultad para concentrarse.  · Olvidos.  · Disminución del interés por el sexo.  · Somnolencia sin motivo aparente.  DIAGNÓSTICO   Para diagnosticar la apnea del sueño, el médico le realizará un examen físico. Un estudio del sueño realizado en la comodidad de cox casa puede ser la opción adecuada si no tiene otras enfermedades. También podrá indicarle que pase la noche en un laboratorio del sueño. En el laboratorio del sueño, varios monitores registran información sobre el corazón, los pulmones y el cerebro hans el sueño. También se registran los movimientos de brazos y piernas, y el nivel de oxígeno en la nasreen.   TRATAMIENTO   Las acciones siguientes pueden ayudar a resolver la apnea del sueño leve:   · Duerma de lado.    · Use un descongestivo, tiene congestión nasal.    · Evite el uso de depresores, incluyendo el alcohol, sedantes y narcóticos.    · Baje de peso y modifique cox dieta si tiene sobrepeso.  Existen dispositivos y tratamientos para ayudar a abrir las vías respiratorias:   · Dispositivos bucales. Son boquillas a medida que modifican ligeramente la posición de la mandíbula inferior hacia adelante y abren la mordida. [place] permite la apertura de las vías respiratorias.  · Dispositivos que generan presión positiva en las vías aéreas. Esta presión positiva mantiene abiertas las vías respiratorias para ayudar a respirar mejor hans el sueño. Los siguientes dispositivos crean tarik presión aérea positiva:  ¨ Dispositivo de presión positiva continua de las vías respiratorias (CPAP). Flory dispositivo CPAP crea un nivel continuo de presión de aire con tarik bomba de aire. El aire se envía a las vías respiratorias a través de tarik máscara hans el sueño. Esta presión continua  mantiene las vías aéreas abiertas.  ¨ Dispositivo de presión espiratoria positiva en las vías aéreas (EPAP). El dispositivo EPAP crea tarik presión positiva de aire a medida que se exhala. Consta de válvulas de un solo uso que se insertan en cada fosa nasal y las mantienen en cox sitio mediante un adhesivo. Las válvulas crean muy poca resistencia al inhalar leatha crean resistencia al exhalar. [place] aumento de la resistencia crea tarik presión de aire positiva. Esta presión positiva al exhalar mantiene abiertas las vías respiratorias, lo que facilita la respiración al inhalar nuevamente.  ¨ Dispositivo de presión positiva de dos niveles en las vías aéreas (BPAP). El dispositivo BPAP se utiliza principalmente en pacientes con apnea del sueño central. Flory dispositivo es similar al dispositivo de CPAP ya que utiliza tarik bomba de aire para suministrar presión de aire continua a través de tarik máscara. Sin embargo, con el dispositivo BPAP la presión se establece en dos niveles diferentes. Al exhalar, la presión es inferior a la presión que se establece al inhalar.  · Cirugía. Por lo general, la cirugía sólo se realiza si no puede cumplir con los tratamientos menos invasivos o si estos no mejoran cox requisito. La cirugía consiste en extraer el exceso de tejidos en las vías aéreas para crear un pasaje más amplio.     Esta información no tiene jaison fin reemplazar el consejo del médico. Asegúrese de hacerle al médico cualquier pregunta que tenga.     Document Released: 09/27/2006 Document Revised: 01/08/2016  [employer] Interactive Patient Education ©2016 [employer]

## 2017-11-23 LAB — ALDOST SERPL-MCNC: 9.9 NG/DL

## 2017-11-29 RX ORDER — FERROUS SULFATE 325(65) MG
TABLET ORAL
Refills: 6 | COMMUNITY
Start: 2017-11-21 | End: 2018-01-10

## 2017-11-30 ENCOUNTER — HOSPITAL ENCOUNTER (OUTPATIENT)
Dept: RADIOLOGY | Facility: MEDICAL CENTER | Age: 47
End: 2017-11-30
Attending: STUDENT IN AN ORGANIZED HEALTH CARE EDUCATION/TRAINING PROGRAM
Payer: COMMERCIAL

## 2017-11-30 DIAGNOSIS — I10 HYPERTENSION, UNSPECIFIED TYPE: ICD-10-CM

## 2017-11-30 DIAGNOSIS — N28.89 RENAL MASS, RIGHT: ICD-10-CM

## 2017-11-30 PROCEDURE — 93975 VASCULAR STUDY: CPT

## 2017-12-05 RX ORDER — CHLORTHALIDONE 25 MG/1
50 TABLET ORAL DAILY
Qty: 30 TAB | Refills: 3 | Status: SHIPPED | OUTPATIENT
Start: 2017-12-05 | End: 2017-12-06 | Stop reason: SDUPTHER

## 2017-12-06 ENCOUNTER — OFFICE VISIT (OUTPATIENT)
Dept: INTERNAL MEDICINE | Facility: MEDICAL CENTER | Age: 47
End: 2017-12-06
Payer: COMMERCIAL

## 2017-12-06 ENCOUNTER — APPOINTMENT (OUTPATIENT)
Dept: CARDIOLOGY | Facility: MEDICAL CENTER | Age: 47
End: 2017-12-06
Attending: STUDENT IN AN ORGANIZED HEALTH CARE EDUCATION/TRAINING PROGRAM
Payer: COMMERCIAL

## 2017-12-06 VITALS
HEART RATE: 93 BPM | SYSTOLIC BLOOD PRESSURE: 164 MMHG | TEMPERATURE: 97 F | RESPIRATION RATE: 19 BRPM | DIASTOLIC BLOOD PRESSURE: 82 MMHG | OXYGEN SATURATION: 96 % | BODY MASS INDEX: 42.07 KG/M2 | HEIGHT: 64 IN | WEIGHT: 246.4 LBS

## 2017-12-06 DIAGNOSIS — E55.9 VITAMIN D DEFICIENCY: ICD-10-CM

## 2017-12-06 DIAGNOSIS — F32.89 OTHER DEPRESSION: ICD-10-CM

## 2017-12-06 DIAGNOSIS — D50.9 IRON DEFICIENCY ANEMIA, UNSPECIFIED IRON DEFICIENCY ANEMIA TYPE: ICD-10-CM

## 2017-12-06 DIAGNOSIS — N28.89 RENAL MASS: ICD-10-CM

## 2017-12-06 DIAGNOSIS — I10 HYPERTENSION, UNSPECIFIED TYPE: ICD-10-CM

## 2017-12-06 DIAGNOSIS — R45.89 DIFFICULTY COPING WITH DISEASE: ICD-10-CM

## 2017-12-06 PROCEDURE — 99214 OFFICE O/P EST MOD 30 MIN: CPT | Mod: GC | Performed by: INTERNAL MEDICINE

## 2017-12-06 RX ORDER — CHLORTHALIDONE 50 MG/1
50 TABLET ORAL DAILY
Qty: 180 TAB | Refills: 1 | Status: ON HOLD | OUTPATIENT
Start: 2017-12-06 | End: 2018-01-18

## 2017-12-06 ASSESSMENT — PATIENT HEALTH QUESTIONNAIRE - PHQ9: CLINICAL INTERPRETATION OF PHQ2 SCORE: 0

## 2017-12-06 ASSESSMENT — PAIN SCALES - GENERAL: PAINLEVEL: NO PAIN

## 2018-01-10 DIAGNOSIS — Z01.812 PRE-OPERATIVE LABORATORY EXAMINATION: ICD-10-CM

## 2018-01-10 DIAGNOSIS — Z01.810 PRE-OPERATIVE CARDIOVASCULAR EXAMINATION: ICD-10-CM

## 2018-01-10 LAB
ALBUMIN SERPL BCP-MCNC: 3.7 G/DL (ref 3.2–4.9)
ALBUMIN/GLOB SERPL: 0.9 G/DL
ALP SERPL-CCNC: 93 U/L (ref 30–99)
ALT SERPL-CCNC: 33 U/L (ref 2–50)
ANION GAP SERPL CALC-SCNC: 7 MMOL/L (ref 0–11.9)
APPEARANCE UR: CLEAR
AST SERPL-CCNC: 25 U/L (ref 12–45)
BACTERIA #/AREA URNS HPF: NEGATIVE /HPF
BASOPHILS # BLD AUTO: 0.3 % (ref 0–1.8)
BASOPHILS # BLD: 0.02 K/UL (ref 0–0.12)
BILIRUB SERPL-MCNC: 0.3 MG/DL (ref 0.1–1.5)
BILIRUB UR QL STRIP.AUTO: NEGATIVE
BUN SERPL-MCNC: 17 MG/DL (ref 8–22)
CALCIUM SERPL-MCNC: 9.1 MG/DL (ref 8.5–10.5)
CHLORIDE SERPL-SCNC: 97 MMOL/L (ref 96–112)
CO2 SERPL-SCNC: 30 MMOL/L (ref 20–33)
COLOR UR: YELLOW
CREAT SERPL-MCNC: 1.11 MG/DL (ref 0.5–1.4)
EKG IMPRESSION: NORMAL
EOSINOPHIL # BLD AUTO: 0.04 K/UL (ref 0–0.51)
EOSINOPHIL NFR BLD: 0.6 % (ref 0–6.9)
EPI CELLS #/AREA URNS HPF: NEGATIVE /HPF
ERYTHROCYTE [DISTWIDTH] IN BLOOD BY AUTOMATED COUNT: 52.8 FL (ref 35.9–50)
GLOBULIN SER CALC-MCNC: 4.1 G/DL (ref 1.9–3.5)
GLUCOSE SERPL-MCNC: 216 MG/DL (ref 65–99)
GLUCOSE UR STRIP.AUTO-MCNC: NEGATIVE MG/DL
HCG SERPL QL: NEGATIVE
HCT VFR BLD AUTO: 36.3 % (ref 37–47)
HGB BLD-MCNC: 11.7 G/DL (ref 12–16)
HYALINE CASTS #/AREA URNS LPF: ABNORMAL /LPF
IMM GRANULOCYTES # BLD AUTO: 0.02 K/UL (ref 0–0.11)
IMM GRANULOCYTES NFR BLD AUTO: 0.3 % (ref 0–0.9)
KETONES UR STRIP.AUTO-MCNC: NEGATIVE MG/DL
LEUKOCYTE ESTERASE UR QL STRIP.AUTO: ABNORMAL
LYMPHOCYTES # BLD AUTO: 1.54 K/UL (ref 1–4.8)
LYMPHOCYTES NFR BLD: 23.6 % (ref 22–41)
MCH RBC QN AUTO: 26.5 PG (ref 27–33)
MCHC RBC AUTO-ENTMCNC: 32.2 G/DL (ref 33.6–35)
MCV RBC AUTO: 82.1 FL (ref 81.4–97.8)
MICRO URNS: ABNORMAL
MONOCYTES # BLD AUTO: 0.32 K/UL (ref 0–0.85)
MONOCYTES NFR BLD AUTO: 4.9 % (ref 0–13.4)
NEUTROPHILS # BLD AUTO: 4.59 K/UL (ref 2–7.15)
NEUTROPHILS NFR BLD: 70.3 % (ref 44–72)
NITRITE UR QL STRIP.AUTO: NEGATIVE
NRBC # BLD AUTO: 0 K/UL
NRBC BLD-RTO: 0 /100 WBC
PH UR STRIP.AUTO: 6.5 [PH]
PLATELET # BLD AUTO: 351 K/UL (ref 164–446)
PMV BLD AUTO: 9.1 FL (ref 9–12.9)
POTASSIUM SERPL-SCNC: 3.3 MMOL/L (ref 3.6–5.5)
PROT SERPL-MCNC: 7.8 G/DL (ref 6–8.2)
PROT UR QL STRIP: NEGATIVE MG/DL
RBC # BLD AUTO: 4.42 M/UL (ref 4.2–5.4)
RBC # URNS HPF: >150 /HPF
RBC UR QL AUTO: ABNORMAL
SODIUM SERPL-SCNC: 134 MMOL/L (ref 135–145)
SP GR UR STRIP.AUTO: 1.01
UROBILINOGEN UR STRIP.AUTO-MCNC: 0.2 MG/DL
WBC # BLD AUTO: 6.5 K/UL (ref 4.8–10.8)
WBC #/AREA URNS HPF: ABNORMAL /HPF

## 2018-01-10 PROCEDURE — 87086 URINE CULTURE/COLONY COUNT: CPT

## 2018-01-10 PROCEDURE — 80053 COMPREHEN METABOLIC PANEL: CPT

## 2018-01-10 PROCEDURE — 81001 URINALYSIS AUTO W/SCOPE: CPT

## 2018-01-10 PROCEDURE — 93005 ELECTROCARDIOGRAM TRACING: CPT

## 2018-01-10 PROCEDURE — 36415 COLL VENOUS BLD VENIPUNCTURE: CPT

## 2018-01-10 PROCEDURE — 93010 ELECTROCARDIOGRAM REPORT: CPT | Performed by: INTERNAL MEDICINE

## 2018-01-10 PROCEDURE — 84703 CHORIONIC GONADOTROPIN ASSAY: CPT

## 2018-01-10 PROCEDURE — 85025 COMPLETE CBC W/AUTO DIFF WBC: CPT

## 2018-01-12 LAB
BACTERIA UR CULT: NORMAL
SIGNIFICANT IND 70042: NORMAL
SITE SITE: NORMAL
SOURCE SOURCE: NORMAL

## 2018-01-18 ENCOUNTER — HOSPITAL ENCOUNTER (INPATIENT)
Facility: MEDICAL CENTER | Age: 48
LOS: 5 days | DRG: 656 | End: 2018-01-23
Attending: UROLOGY | Admitting: UROLOGY
Payer: COMMERCIAL

## 2018-01-18 DIAGNOSIS — D41.01 NEOPLASM OF UNCERTAIN BEHAVIOR OF RIGHT KIDNEY: ICD-10-CM

## 2018-01-18 DIAGNOSIS — G89.28 CHRONIC POSTOPERATIVE PAIN: ICD-10-CM

## 2018-01-18 LAB
ANION GAP SERPL CALC-SCNC: 13 MMOL/L (ref 0–11.9)
BUN SERPL-MCNC: 17 MG/DL (ref 8–22)
CALCIUM SERPL-MCNC: 8 MG/DL (ref 8.5–10.5)
CHLORIDE SERPL-SCNC: 98 MMOL/L (ref 96–112)
CO2 SERPL-SCNC: 25 MMOL/L (ref 20–33)
CREAT SERPL-MCNC: 1.34 MG/DL (ref 0.5–1.4)
ERYTHROCYTE [DISTWIDTH] IN BLOOD BY AUTOMATED COUNT: 53.7 FL (ref 35.9–50)
GLUCOSE SERPL-MCNC: 326 MG/DL (ref 65–99)
HCG UR QL: NEGATIVE
HCT VFR BLD AUTO: 30.6 % (ref 37–47)
HGB BLD-MCNC: 9.7 G/DL (ref 12–16)
MCH RBC QN AUTO: 26.9 PG (ref 27–33)
MCHC RBC AUTO-ENTMCNC: 31.7 G/DL (ref 33.6–35)
MCV RBC AUTO: 84.8 FL (ref 81.4–97.8)
PLATELET # BLD AUTO: 304 K/UL (ref 164–446)
PMV BLD AUTO: 8.8 FL (ref 9–12.9)
POTASSIUM SERPL-SCNC: 3.7 MMOL/L (ref 3.6–5.5)
RBC # BLD AUTO: 3.61 M/UL (ref 4.2–5.4)
SODIUM SERPL-SCNC: 136 MMOL/L (ref 135–145)
SP GR UR REFRACTOMETRY: 1.01
WBC # BLD AUTO: 11.8 K/UL (ref 4.8–10.8)

## 2018-01-18 PROCEDURE — 501576 HCHG TROCAR, SMTH CAN&SEAL12: Performed by: UROLOGY

## 2018-01-18 PROCEDURE — 160009 HCHG ANES TIME/MIN: Performed by: UROLOGY

## 2018-01-18 PROCEDURE — 500301 HCHG CLIP, HEMOLOCK: Performed by: UROLOGY

## 2018-01-18 PROCEDURE — 80048 BASIC METABOLIC PNL TOTAL CA: CPT

## 2018-01-18 PROCEDURE — 501570 HCHG TROCAR, SEPARATOR: Performed by: UROLOGY

## 2018-01-18 PROCEDURE — 501838 HCHG SUTURE GENERAL: Performed by: UROLOGY

## 2018-01-18 PROCEDURE — 88307 TISSUE EXAM BY PATHOLOGIST: CPT

## 2018-01-18 PROCEDURE — 85027 COMPLETE CBC AUTOMATED: CPT

## 2018-01-18 PROCEDURE — 700111 HCHG RX REV CODE 636 W/ 250 OVERRIDE (IP)

## 2018-01-18 PROCEDURE — 700101 HCHG RX REV CODE 250: Performed by: UROLOGY

## 2018-01-18 PROCEDURE — 501571 HCHG TROCAR, SEPARATOR 12X100: Performed by: UROLOGY

## 2018-01-18 PROCEDURE — 501450 HCHG STAPLES, ENDO MULTIFIRE: Performed by: UROLOGY

## 2018-01-18 PROCEDURE — 160002 HCHG RECOVERY MINUTES (STAT): Performed by: UROLOGY

## 2018-01-18 PROCEDURE — 503366 HCHG TROCAR, 12X150 KII FIOS Z THR: Performed by: UROLOGY

## 2018-01-18 PROCEDURE — 700102 HCHG RX REV CODE 250 W/ 637 OVERRIDE(OP): Performed by: UROLOGY

## 2018-01-18 PROCEDURE — 160035 HCHG PACU - 1ST 60 MINS PHASE I: Performed by: UROLOGY

## 2018-01-18 PROCEDURE — 770006 HCHG ROOM/CARE - MED/SURG/GYN SEMI*

## 2018-01-18 PROCEDURE — 160048 HCHG OR STATISTICAL LEVEL 1-5: Performed by: UROLOGY

## 2018-01-18 PROCEDURE — 500698 HCHG HEMOCLIP, MEDIUM: Performed by: UROLOGY

## 2018-01-18 PROCEDURE — 88305 TISSUE EXAM BY PATHOLOGIST: CPT

## 2018-01-18 PROCEDURE — 0TT00ZZ RESECTION OF RIGHT KIDNEY, OPEN APPROACH: ICD-10-PCS | Performed by: UROLOGY

## 2018-01-18 PROCEDURE — 700101 HCHG RX REV CODE 250

## 2018-01-18 PROCEDURE — A4314 CATH W/DRAINAGE 2-WAY LATEX: HCPCS | Performed by: UROLOGY

## 2018-01-18 PROCEDURE — 500514 HCHG ENDOCLIP: Performed by: UROLOGY

## 2018-01-18 PROCEDURE — 502000 HCHG MISC OR IMPLANTS RC 0278: Performed by: UROLOGY

## 2018-01-18 PROCEDURE — 500376 HCHG DRAIN, J-P ROUND 15FR: Performed by: UROLOGY

## 2018-01-18 PROCEDURE — 160036 HCHG PACU - EA ADDL 30 MINS PHASE I: Performed by: UROLOGY

## 2018-01-18 PROCEDURE — 160031 HCHG SURGERY MINUTES - 1ST 30 MINS LEVEL 5: Performed by: UROLOGY

## 2018-01-18 PROCEDURE — 500697 HCHG HEMOCLIP, LARGE (ORANGE): Performed by: UROLOGY

## 2018-01-18 PROCEDURE — 500389 HCHG DRAIN, RESERVOIR SUCT JP 100CC: Performed by: UROLOGY

## 2018-01-18 PROCEDURE — 81025 URINE PREGNANCY TEST: CPT

## 2018-01-18 PROCEDURE — 501399 HCHG SPECIMAN BAG, ENDO CATC: Performed by: UROLOGY

## 2018-01-18 PROCEDURE — 36415 COLL VENOUS BLD VENIPUNCTURE: CPT

## 2018-01-18 PROCEDURE — 501338 HCHG SHEARS, ENDO: Performed by: UROLOGY

## 2018-01-18 PROCEDURE — A6402 STERILE GAUZE <= 16 SQ IN: HCPCS | Performed by: UROLOGY

## 2018-01-18 PROCEDURE — 500868 HCHG NEEDLE, SURGI(VARES): Performed by: UROLOGY

## 2018-01-18 PROCEDURE — A9270 NON-COVERED ITEM OR SERVICE: HCPCS | Performed by: UROLOGY

## 2018-01-18 PROCEDURE — 160042 HCHG SURGERY MINUTES - EA ADDL 1 MIN LEVEL 5: Performed by: UROLOGY

## 2018-01-18 PROCEDURE — 06Q90ZZ REPAIR RIGHT RENAL VEIN, OPEN APPROACH: ICD-10-PCS | Performed by: SURGERY

## 2018-01-18 PROCEDURE — 700111 HCHG RX REV CODE 636 W/ 250 OVERRIDE (IP): Performed by: UROLOGY

## 2018-01-18 PROCEDURE — 502714 HCHG ROBOTIC SURGERY SERVICES: Performed by: UROLOGY

## 2018-01-18 PROCEDURE — 88331 PATH CONSLTJ SURG 1 BLK 1SPC: CPT

## 2018-01-18 DEVICE — PTFE PLED STER - (250/CA): Type: IMPLANTABLE DEVICE | Status: FUNCTIONAL

## 2018-01-18 RX ORDER — CHLORTHALIDONE 50 MG/1
50 TABLET ORAL EVERY EVENING
COMMUNITY
End: 2018-04-12

## 2018-01-18 RX ORDER — SODIUM CHLORIDE, SODIUM LACTATE, POTASSIUM CHLORIDE, CALCIUM CHLORIDE 600; 310; 30; 20 MG/100ML; MG/100ML; MG/100ML; MG/100ML
INJECTION, SOLUTION INTRAVENOUS CONTINUOUS
Status: DISCONTINUED | OUTPATIENT
Start: 2018-01-18 | End: 2018-01-23 | Stop reason: HOSPADM

## 2018-01-18 RX ORDER — DEXTROSE MONOHYDRATE, SODIUM CHLORIDE, AND POTASSIUM CHLORIDE 50; 1.49; 4.5 G/1000ML; G/1000ML; G/1000ML
INJECTION, SOLUTION INTRAVENOUS CONTINUOUS
Status: DISCONTINUED | OUTPATIENT
Start: 2018-01-18 | End: 2018-01-23 | Stop reason: HOSPADM

## 2018-01-18 RX ORDER — AMLODIPINE BESYLATE 10 MG/1
5 TABLET ORAL EVERY EVENING
Status: DISCONTINUED | OUTPATIENT
Start: 2018-01-18 | End: 2018-01-23 | Stop reason: HOSPADM

## 2018-01-18 RX ORDER — HYDROMORPHONE HYDROCHLORIDE 2 MG/ML
0.5 INJECTION, SOLUTION INTRAMUSCULAR; INTRAVENOUS; SUBCUTANEOUS ONCE
Status: COMPLETED | OUTPATIENT
Start: 2018-01-18 | End: 2018-01-18

## 2018-01-18 RX ORDER — CHLORTHALIDONE 50 MG/1
50 TABLET ORAL EVERY EVENING
Status: DISCONTINUED | OUTPATIENT
Start: 2018-01-18 | End: 2018-01-23 | Stop reason: HOSPADM

## 2018-01-18 RX ORDER — BUPIVACAINE HYDROCHLORIDE AND EPINEPHRINE 5; 5 MG/ML; UG/ML
INJECTION, SOLUTION EPIDURAL; INTRACAUDAL; PERINEURAL
Status: DISCONTINUED | OUTPATIENT
Start: 2018-01-18 | End: 2018-01-18 | Stop reason: HOSPADM

## 2018-01-18 RX ORDER — AMLODIPINE BESYLATE 5 MG/1
5 TABLET ORAL EVERY EVENING
COMMUNITY

## 2018-01-18 RX ORDER — ONDANSETRON 2 MG/ML
4 INJECTION INTRAMUSCULAR; INTRAVENOUS EVERY 6 HOURS PRN
Status: DISCONTINUED | OUTPATIENT
Start: 2018-01-18 | End: 2018-01-23 | Stop reason: HOSPADM

## 2018-01-18 RX ORDER — HYDROMORPHONE HYDROCHLORIDE 2 MG/ML
INJECTION, SOLUTION INTRAMUSCULAR; INTRAVENOUS; SUBCUTANEOUS
Status: COMPLETED
Start: 2018-01-18 | End: 2018-01-18

## 2018-01-18 RX ORDER — MIDAZOLAM HYDROCHLORIDE 1 MG/ML
INJECTION INTRAMUSCULAR; INTRAVENOUS
Status: DISPENSED
Start: 2018-01-18 | End: 2018-01-18

## 2018-01-18 RX ORDER — ACETAMINOPHEN 500 MG
1000 TABLET ORAL EVERY 6 HOURS
Status: DISCONTINUED | OUTPATIENT
Start: 2018-01-18 | End: 2018-01-23 | Stop reason: HOSPADM

## 2018-01-18 RX ADMIN — HYDROMORPHONE HYDROCHLORIDE 0.5 MG: 2 INJECTION INTRAMUSCULAR; INTRAVENOUS; SUBCUTANEOUS at 19:00

## 2018-01-18 RX ADMIN — HYDROMORPHONE HYDROCHLORIDE 0.5 MG: 2 INJECTION INTRAMUSCULAR; INTRAVENOUS; SUBCUTANEOUS at 20:59

## 2018-01-18 RX ADMIN — AMLODIPINE BESYLATE 5 MG: 5 TABLET ORAL at 21:01

## 2018-01-18 RX ADMIN — HYDROMORPHONE HYDROCHLORIDE 0.5 MG: 2 INJECTION INTRAMUSCULAR; INTRAVENOUS; SUBCUTANEOUS at 19:15

## 2018-01-18 RX ADMIN — ACETAMINOPHEN 1000 MG: 500 TABLET ORAL at 20:59

## 2018-01-18 RX ADMIN — FENTANYL CITRATE 50 MCG: 50 INJECTION, SOLUTION INTRAMUSCULAR; INTRAVENOUS at 17:56

## 2018-01-18 RX ADMIN — ONDANSETRON 4 MG: 2 INJECTION INTRAMUSCULAR; INTRAVENOUS at 21:42

## 2018-01-18 RX ADMIN — SODIUM CHLORIDE, SODIUM LACTATE, POTASSIUM CHLORIDE, CALCIUM CHLORIDE: 600; 310; 30; 20 INJECTION, SOLUTION INTRAVENOUS at 11:02

## 2018-01-18 RX ADMIN — HYDROMORPHONE HYDROCHLORIDE: 2 INJECTION INTRAMUSCULAR; INTRAVENOUS; SUBCUTANEOUS at 17:45

## 2018-01-18 RX ADMIN — HYDROMORPHONE HYDROCHLORIDE 0.5 MG: 2 INJECTION INTRAMUSCULAR; INTRAVENOUS; SUBCUTANEOUS at 18:43

## 2018-01-18 RX ADMIN — CHLORTHALIDONE 50 MG: 50 TABLET ORAL at 21:48

## 2018-01-18 RX ADMIN — FENTANYL CITRATE 50 MCG: 50 INJECTION, SOLUTION INTRAMUSCULAR; INTRAVENOUS at 18:10

## 2018-01-18 RX ADMIN — POTASSIUM CHLORIDE, DEXTROSE MONOHYDRATE AND SODIUM CHLORIDE: 150; 5; 450 INJECTION, SOLUTION INTRAVENOUS at 20:58

## 2018-01-18 ASSESSMENT — PATIENT HEALTH QUESTIONNAIRE - PHQ9
1. LITTLE INTEREST OR PLEASURE IN DOING THINGS: NOT AT ALL
SUM OF ALL RESPONSES TO PHQ9 QUESTIONS 1 AND 2: 0
2. FEELING DOWN, DEPRESSED, IRRITABLE, OR HOPELESS: NOT AT ALL
SUM OF ALL RESPONSES TO PHQ QUESTIONS 1-9: 0

## 2018-01-18 ASSESSMENT — PAIN SCALES - GENERAL
PAINLEVEL_OUTOF10: 0
PAINLEVEL_OUTOF10: 7
PAINLEVEL_OUTOF10: 7
PAINLEVEL_OUTOF10: ASSUMED PAIN PRESENT
PAINLEVEL_OUTOF10: 8
PAINLEVEL_OUTOF10: 6
PAINLEVEL_OUTOF10: 7
PAINLEVEL_OUTOF10: 7
PAINLEVEL_OUTOF10: ASSUMED PAIN PRESENT
PAINLEVEL_OUTOF10: ASSUMED PAIN PRESENT
PAINLEVEL_OUTOF10: 7
PAINLEVEL_OUTOF10: 6

## 2018-01-18 ASSESSMENT — LIFESTYLE VARIABLES
ALCOHOL_USE: NO
EVER_SMOKED: NEVER

## 2018-01-18 NOTE — PROGRESS NOTES
The Medication Reconciliation process has been completed by interviewing the patient and family    Allergies have been reviewed  Antibiotic use in 30 days - none    [facility] Pharmacy:  [facility]

## 2018-01-19 LAB
ANION GAP SERPL CALC-SCNC: 9 MMOL/L (ref 0–11.9)
BUN SERPL-MCNC: 18 MG/DL (ref 8–22)
CALCIUM SERPL-MCNC: 7.4 MG/DL (ref 8.5–10.5)
CHLORIDE SERPL-SCNC: 97 MMOL/L (ref 96–112)
CO2 SERPL-SCNC: 27 MMOL/L (ref 20–33)
CREAT SERPL-MCNC: 1.4 MG/DL (ref 0.5–1.4)
ERYTHROCYTE [DISTWIDTH] IN BLOOD BY AUTOMATED COUNT: 53.3 FL (ref 35.9–50)
GLUCOSE SERPL-MCNC: 392 MG/DL (ref 65–99)
HCT VFR BLD AUTO: 29 % (ref 37–47)
HGB BLD-MCNC: 9.2 G/DL (ref 12–16)
MCH RBC QN AUTO: 26.8 PG (ref 27–33)
MCHC RBC AUTO-ENTMCNC: 31.7 G/DL (ref 33.6–35)
MCV RBC AUTO: 84.5 FL (ref 81.4–97.8)
PLATELET # BLD AUTO: 283 K/UL (ref 164–446)
PMV BLD AUTO: 9 FL (ref 9–12.9)
POTASSIUM SERPL-SCNC: 4 MMOL/L (ref 3.6–5.5)
RBC # BLD AUTO: 3.43 M/UL (ref 4.2–5.4)
SODIUM SERPL-SCNC: 133 MMOL/L (ref 135–145)
WBC # BLD AUTO: 10.9 K/UL (ref 4.8–10.8)

## 2018-01-19 PROCEDURE — 700111 HCHG RX REV CODE 636 W/ 250 OVERRIDE (IP): Performed by: UROLOGY

## 2018-01-19 PROCEDURE — 80048 BASIC METABOLIC PNL TOTAL CA: CPT

## 2018-01-19 PROCEDURE — 85027 COMPLETE CBC AUTOMATED: CPT

## 2018-01-19 PROCEDURE — A9270 NON-COVERED ITEM OR SERVICE: HCPCS | Performed by: UROLOGY

## 2018-01-19 PROCEDURE — 700102 HCHG RX REV CODE 250 W/ 637 OVERRIDE(OP): Performed by: UROLOGY

## 2018-01-19 PROCEDURE — 770006 HCHG ROOM/CARE - MED/SURG/GYN SEMI*

## 2018-01-19 PROCEDURE — 36415 COLL VENOUS BLD VENIPUNCTURE: CPT

## 2018-01-19 PROCEDURE — 700101 HCHG RX REV CODE 250: Performed by: UROLOGY

## 2018-01-19 RX ADMIN — CHLORTHALIDONE 50 MG: 50 TABLET ORAL at 20:14

## 2018-01-19 RX ADMIN — ENOXAPARIN SODIUM 40 MG: 100 INJECTION SUBCUTANEOUS at 09:34

## 2018-01-19 RX ADMIN — POTASSIUM CHLORIDE, DEXTROSE MONOHYDRATE AND SODIUM CHLORIDE: 150; 5; 450 INJECTION, SOLUTION INTRAVENOUS at 12:41

## 2018-01-19 RX ADMIN — AMLODIPINE BESYLATE 5 MG: 5 TABLET ORAL at 20:14

## 2018-01-19 RX ADMIN — ONDANSETRON 4 MG: 2 INJECTION INTRAMUSCULAR; INTRAVENOUS at 03:51

## 2018-01-19 RX ADMIN — POTASSIUM CHLORIDE, DEXTROSE MONOHYDRATE AND SODIUM CHLORIDE: 150; 5; 450 INJECTION, SOLUTION INTRAVENOUS at 05:42

## 2018-01-19 RX ADMIN — ACETAMINOPHEN 1000 MG: 500 TABLET ORAL at 00:01

## 2018-01-19 RX ADMIN — ACETAMINOPHEN 1000 MG: 500 TABLET ORAL at 12:39

## 2018-01-19 RX ADMIN — ACETAMINOPHEN 1000 MG: 500 TABLET ORAL at 05:41

## 2018-01-19 RX ADMIN — POTASSIUM CHLORIDE, DEXTROSE MONOHYDRATE AND SODIUM CHLORIDE: 150; 5; 450 INJECTION, SOLUTION INTRAVENOUS at 20:23

## 2018-01-19 RX ADMIN — MORPHINE SULFATE 50 MG: 50 INJECTION, SOLUTION, CONCENTRATE INTRAVENOUS at 09:38

## 2018-01-19 ASSESSMENT — PAIN SCALES - GENERAL
PAINLEVEL_OUTOF10: 5
PAINLEVEL_OUTOF10: 8
PAINLEVEL_OUTOF10: 6

## 2018-01-19 NOTE — OR NURSING
Pt VSS. O2 sat was 99% on 2 L while awake, after pain medication administration she would drift off to sleep and stop breathing, O2 down to 80 until she was woken up and then she would immediately return to high 90s. Alert and oriented, TERRIE. Report given to Juana,  notified of going to room and updated on care.

## 2018-01-19 NOTE — DIETARY
NUTRITION SERVICES: BMI - Pt with BMI >40 (=41.79). Weight loss counseling not appropriate in acute care setting. RECOMMEND - Referral to outpatient nutrition services for weight management after D/C.

## 2018-01-19 NOTE — CARE PLAN
Problem: Pain Management  Goal: Pain level will decrease to patient's comfort goal  Outcome: PROGRESSING AS EXPECTED  PCA medication switched from dilaudid to morphine per pt request.    Problem: Safety  Goal: Will remain free from injury  Outcome: PROGRESSING AS EXPECTED  Bed low and locked, call light and belongings in reach, hourly rounding in place,  at bedside

## 2018-01-19 NOTE — CARE PLAN
Problem: Pain Management  Goal: Pain level will decrease to patient's comfort goal  Outcome: PROGRESSING AS EXPECTED  PCA Dilaudid in place. Assess pain level at beginning of admitting. (see MAR) for additional pain medication.    Problem: Communication  Goal: The ability to communicate needs accurately and effectively will improve  Outcome: PROGRESSING AS EXPECTED  Discuss POC with pt and  for the evening. Hourly rounding in place. Call light within reach.

## 2018-01-19 NOTE — OR SURGEON
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 Myron J Gomez, M.D.    Anesthesiologist/Type of Anesthesia:  Anesthesiologist: Phong Guidry M.D./General    Surgical Staff:  Circulator: Huong Lu R.N.  Relief Circulator: Tamiko Garcia R.N.; Krishna Blanchard R.N.; Umer Baker R.N.  Relief Scrub: Iveth Varela  Scrub Person: Sameera Agosto; Elias Vizcaino  Count [place]: Idris Crocker R.N.    Specimens:  * No specimens in log *    Estimated Blood Loss: 500mL    Findings: tumor to IVC.  intra-op consult for assistance with vena cava closure.    Complications: none observed        1/18/2018 5:16 PM Isaac Hicks

## 2018-01-19 NOTE — PROGRESS NOTES
46 yo F  Full Code  NPO  Admit 1/18- R renal tumor    Assessment done    A+O x4  Arabic speaking only   at bedside    2.5L NC 96%    PCA in use  Medication switched from dilaudid to morphine per active order  Setting verified    Absent BS x4  R abd dressing CDI  R abd lap sites x3 CDI  R flank dressing CDI    Couch in place with statlocke  Bag dated and initialed  Couch care given    L upper arm 18g PIV CDI, flushes easily, no blood return present, saline locked     L wrist 20g PIV CDI, D5 1/2NS +20K running at 125 ml/hr    1+ edema to BLE, elevated on pillows    Bed low and locked, call light and belongings in reach, hourly rounding in place    SCDs off    Discussed POC    All needs met at this time

## 2018-01-19 NOTE — OP REPORT
DATE OF SERVICE:  01/18/2018    NAME OF OPERATION:  Robotic-assisted laparoscopic right radical nephrectomy,   converted to open nephrectomy, complicated.    PREOPERATIVE DIAGNOSIS:  A 7 cm right renal mass.    POSTOPERATIVE DIAGNOSES:  1.  A 7 cm right renal mass.  2.  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    PRIMARY SURGEON:  Isaac Hicks MD.    FIRST ASSISTANT:  Ebenezer Patton MD.    INTRAOPERATIVE CONSULT:  Wade Hui MD for assistance with vascular   repair of vena cava.    ANESTHESIOLOGIST:  Phong Guidry MD.    FINDINGS:  Complete mobilization of the kidney laparoscopically with evidence   of tumor thrombus extending to the os of the vena cava.  Decision was made to   open, given the risk of positive margin.  Intraoperative frozen margin   demonstrated no tumor on the vena cava side.  Dr. Hui was consulted for   assistance with the repair of the vena cava.    INDICATIONS:  Briefly, the patient is a 47-year-old woman with an incidental   finding of a large right renal mass.  Upon consideration of her options, she   elected to undergo surgical management.  Informed consent has been obtained.    This mass has been diagnosed some months ago, but for various reasons, per the   patient, this has been delayed until now.    OPERATION IN DETAIL:  The patient was taken to the operating room, placed on   the operating table in supine position.  After administration of general   anesthetic, she was placed in a modified decubitus position, right side up on   gel bolster.  She was secured to the table.  Couch catheter was placed.  Her   abdomen and flank were prepped and draped sterilely.  Insufflation of the   abdomen was obtained with a Veress needle technique in the right mid abdomen.    A 12 mm incision was made through which a long 12 mm trocar was placed.    Camera placement demonstrated atraumatic entry into the abdominal cavity.  Two   assist ports as well as 3 robotic ports were then placed  under direct visual   inspection.  The robot was then docked at the bedside.  Right colon was taken   down from its lateral attachments and reflected medially.  There was very   little Gerota's fat anteriorly.  Posteriorly, there was a more significant   amount of Gerota's fat.  The duodenum was kocherized to allow further   mobilization of the kidney laterally and anteriorly.  I was able to identify   the ureter as it coursed the retroperitoneum by the lower pole and elevate   this over the psoas muscle.    Dissection was then carried up over the vena cava where the insertion of the   gonadal vein was taken between Hem-o-Eric clips.  There was a significant   amount of vascularity in the hilum making this portion of the case somewhat   tedious and longer extending the dissection by at least 25 minutes.  The   artery was identified and 2 large Hem-o-Eric clips were placed on the aorta   side, one on the kidney side and the artery was transected.    At this point in time, it became quite apparent that there was thrombus in the   vena cava.  A decision was made to continue with mobilization of the kidney   with eventual decision to come.    Remainder of the kidney, that is the lower pole lateral aspect as well as   superior aspect including the adrenal gland were completely mobilized and   freed within the abdomen.  The only thing attaching the kidney to the body was   the renal vein.  With multiple maneuvers to try to adjust the kidney into a   position whereby we felt it is safe to take a stapler across, and eventually   decision was made by me to convert to an open procedure.    A right subcostal incision was made.  Sharp dissection was carried down   through the anterior wall musculature until the peritoneum was identified and   opened along the length of the incision.  With a retractor set, I was able to   get clear visualization of the vena cava as well as the insertion of the renal   vein.  I palpated the tumor  within the renal vein and was able to milk it   back away from the vena cava into back towards the vein itself.  A Satinsky   clamp was then placed across the vena cava below the insertion of the renal   vein and I transected the vein.  At this point, tumor was of course evident   coming through the renal vein and pathology was consulted.  I was able to   obtain a margin on one aspect that was slightly suspicious for contamination   of gross tumor.  Intraoperatively, this came back to us as no carcinoma.    A 4-0 Prolene suture was then used to run below the Satinsky clamp in 2   directions.  With release of the Satinsky clamp, there were several bleeding   points that were controlled with vascular clamps.  At this point, a decision   was made to consult vascular surgery and Dr. Hui was able to participate in   this portion of the case.    Repair was carried out with additional 4-0 Prolene sutures.  There was some   narrowing of the vena cava as was anticipated, but nothing dramatic.  There   was excellent hemostasis.  A bit of Surgiflo was placed over the vena cava as   well as in the undersurface of the liver.  Some Nu-Knit was placed just over   the vena cava repair as well as tucked under the liver.  There was no evidence   of any additional ongoing bleeding.    The internal muscular layer was closed with a running 0 Vicryl suture.  The   remaining abdominal wall fascial layer was closed in one with a running #1 PDS   looped suture.  Wound was copiously irrigated and 3-0 Vicryl was used to   close the copious deep fatty tissues in a running manner.  Staples were used   to close the skin as well as all the remaining port sites.  Patient tolerated   the procedure well and was taken to recovery room in stable condition.       ____________________________________     Isaac Hicks MD MCM / MONICA    DD:  01/18/2018 17:28:34  DT:  01/18/2018 18:29:17    D#:  [id number]  Job#:  [id number]

## 2018-01-19 NOTE — PROGRESS NOTES
"2001: Received report from PACU RN, Abi. Awaiting for pt arrival to [facility].    2058: Pt admitted to room T432-2 via transport in [facility] from PACU at 2050.  Pt /74   Pulse 92   Temp 36.6 °C (97.9 °F)   Resp 14   Ht 1.626 m (5' 4.02\")   Wt 110.5 kg (243 lb 9.7 oz)   LMP 01/01/2018   SpO2 95%   Breastfeeding? No   BMI 41.79 kg/m²   Pain reported at 6/10 on a scale of 0-10. Oriented to room call light and smoking policy.  Reviewed plan of care (equipment, incentive spirometer, sequential compression devices, medications, activity, diet, fall precautions, skin care, and pain) with patient and family.    Skin check done at bedside. Noted 3 x lap sites with transverse incision to R abdomen, dressing in place, CDI. In general skin intact. No skin issues noted.   "

## 2018-01-19 NOTE — PROGRESS NOTES
"Urology Progress Note    Post op Day # 1    Overnight Events: None    S: No fevers, chills, complains of pain.  also nausea with dilaudid PCA.  Has not gotten out of bed, doesn't feel she can    O:   Blood pressure 107/63, pulse 77, temperature 36.1 °C (96.9 °F), resp. rate 16, height 1.626 m (5' 4.02\"), weight 110.5 kg (243 lb 9.7 oz), last menstrual period 01/01/2018, SpO2 95 %, not currently breastfeeding.  Recent Labs      01/18/18 1951 01/19/18   0155   SODIUM  136  133*   POTASSIUM  3.7  4.0   CHLORIDE  98  97   CO2  25  27   GLUCOSE  326*  392*   BUN  17  18   CREATININE  1.34  1.40   CALCIUM  8.0*  7.4*     Recent Labs      01/18/18 1951 01/19/18   0155   WBC  11.8*  10.9*   RBC  3.61*  3.43*   HEMOGLOBIN  9.7*  9.2*   HEMATOCRIT  30.6*  29.0*   MCV  84.8  84.5   MCH  26.9*  26.8*   MCHC  31.7*  31.7*   RDW  53.7*  53.3*   PLATELETCT  304  283   MPV  8.8*  9.0         Intake/Output Summary (Last 24 hours) at 01/19/18 0740  Last data filed at 01/19/18 0544   Gross per 24 hour   Intake           5113.6 ml   Output             1400 ml   Net           3713.6 ml       Exam:  Abdomen soft, benign but tender on right..  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 Pain. Nausea.  Ambulate, IS.  Change PCA to morphine  Begin lovenox today  Continue lamb for now  Continue NPO for now  "

## 2018-01-20 LAB
ANION GAP SERPL CALC-SCNC: 6 MMOL/L (ref 0–11.9)
BUN SERPL-MCNC: 11 MG/DL (ref 8–22)
CALCIUM SERPL-MCNC: 8 MG/DL (ref 8.5–10.5)
CHLORIDE SERPL-SCNC: 102 MMOL/L (ref 96–112)
CO2 SERPL-SCNC: 29 MMOL/L (ref 20–33)
CREAT SERPL-MCNC: 1.2 MG/DL (ref 0.5–1.4)
ERYTHROCYTE [DISTWIDTH] IN BLOOD BY AUTOMATED COUNT: 55.8 FL (ref 35.9–50)
GLUCOSE SERPL-MCNC: 218 MG/DL (ref 65–99)
HCT VFR BLD AUTO: 27.8 % (ref 37–47)
HGB BLD-MCNC: 8.8 G/DL (ref 12–16)
MCH RBC QN AUTO: 27.2 PG (ref 27–33)
MCHC RBC AUTO-ENTMCNC: 31.7 G/DL (ref 33.6–35)
MCV RBC AUTO: 85.8 FL (ref 81.4–97.8)
PLATELET # BLD AUTO: 248 K/UL (ref 164–446)
PMV BLD AUTO: 9 FL (ref 9–12.9)
POTASSIUM SERPL-SCNC: 3.8 MMOL/L (ref 3.6–5.5)
RBC # BLD AUTO: 3.24 M/UL (ref 4.2–5.4)
SODIUM SERPL-SCNC: 137 MMOL/L (ref 135–145)
WBC # BLD AUTO: 8.3 K/UL (ref 4.8–10.8)

## 2018-01-20 PROCEDURE — 36415 COLL VENOUS BLD VENIPUNCTURE: CPT

## 2018-01-20 PROCEDURE — 770006 HCHG ROOM/CARE - MED/SURG/GYN SEMI*

## 2018-01-20 PROCEDURE — 85027 COMPLETE CBC AUTOMATED: CPT

## 2018-01-20 PROCEDURE — A9270 NON-COVERED ITEM OR SERVICE: HCPCS | Performed by: UROLOGY

## 2018-01-20 PROCEDURE — 700102 HCHG RX REV CODE 250 W/ 637 OVERRIDE(OP): Performed by: UROLOGY

## 2018-01-20 PROCEDURE — 80048 BASIC METABOLIC PNL TOTAL CA: CPT

## 2018-01-20 PROCEDURE — 700101 HCHG RX REV CODE 250: Performed by: UROLOGY

## 2018-01-20 PROCEDURE — 700111 HCHG RX REV CODE 636 W/ 250 OVERRIDE (IP): Performed by: UROLOGY

## 2018-01-20 RX ADMIN — POTASSIUM CHLORIDE, DEXTROSE MONOHYDRATE AND SODIUM CHLORIDE: 150; 5; 450 INJECTION, SOLUTION INTRAVENOUS at 18:18

## 2018-01-20 RX ADMIN — POTASSIUM CHLORIDE, DEXTROSE MONOHYDRATE AND SODIUM CHLORIDE: 150; 5; 450 INJECTION, SOLUTION INTRAVENOUS at 04:21

## 2018-01-20 RX ADMIN — CHLORTHALIDONE 50 MG: 50 TABLET ORAL at 21:02

## 2018-01-20 RX ADMIN — ACETAMINOPHEN 1000 MG: 500 TABLET ORAL at 18:15

## 2018-01-20 RX ADMIN — POTASSIUM CHLORIDE, DEXTROSE MONOHYDRATE AND SODIUM CHLORIDE: 150; 5; 450 INJECTION, SOLUTION INTRAVENOUS at 11:40

## 2018-01-20 RX ADMIN — ACETAMINOPHEN 1000 MG: 500 TABLET ORAL at 06:43

## 2018-01-20 RX ADMIN — ACETAMINOPHEN 1000 MG: 500 TABLET ORAL at 11:45

## 2018-01-20 RX ADMIN — ENOXAPARIN SODIUM 40 MG: 100 INJECTION SUBCUTANEOUS at 10:27

## 2018-01-20 RX ADMIN — AMLODIPINE BESYLATE 5 MG: 5 TABLET ORAL at 21:02

## 2018-01-20 ASSESSMENT — PAIN SCALES - GENERAL
PAINLEVEL_OUTOF10: ASSUMED PAIN PRESENT
PAINLEVEL_OUTOF10: 7

## 2018-01-20 NOTE — PROGRESS NOTES
Report received from day RN. Assumed care at 1915. A/O x4. Family at bedside. No attempt to get OOB at this time. Discuss with pt to use call light when needing assistance. Pt is primarily Mongolian speaking, understands a little bit of english.  Reports pain 8/10 out of 0-10 pain scale at this time. PCA morphine in use now, changed from dilaudid last night. Tolerating medication more.  No c/o of N/V. NPO with ice chips.  (-) flatus, (-) BM, hypoactive BS, (+) output in lamb, yellow clear in color.  Noted: R side flank and R lap sites x 3, gauze, cloth tape, CDI  No ambulation at this time, pt was able to get to chair during dayshift.   SCD's on, vss  Reviewed POC for evening. All questions answered.   Call light within reach. Bed at lowest position w/ bed brakes locked. Hourly rounding in place.

## 2018-01-20 NOTE — CARE PLAN
Problem: Pain Management  Goal: Pain level will decrease to patient's comfort goal  Outcome: PROGRESSING AS EXPECTED  PCA changed from Dilaudid to Morphine. Pt more tolerant of medication. Effective, reviewed usage of PCA control.     Problem: Safety  Goal: Will remain free from falls  Outcome: PROGRESSING AS EXPECTED  Bed at lowest position, with upper side rails up. Bed brakes locked. Call light within reach. Hourly rounding in place. Family at bedside.

## 2018-01-20 NOTE — CARE PLAN
Problem: Safety  Goal: Will remain free from injury  Outcome: PROGRESSING AS EXPECTED  Bed low and locked, call light and belongings in reach, hourly rounding in place, pt calls for assistance, family at bedside, only ambulates with staff assistance    Problem: Mobility  Goal: Risk for activity intolerance will decrease  Outcome: PROGRESSING SLOWER THAN EXPECTED  Up to chair 1/19  Ambulate in dougherty 1/20- 100 ft with FWW and 2+ staff assist, pt tolerated with minimal c/o pain

## 2018-01-20 NOTE — PROGRESS NOTES
"Urology Progress Note    Post op Day # 2    Overnight Events: None    S: No fevers, chills, nausea or vomiting.  No flatus. Complaining of pain but not using PCA. Not ambulating. Still NPO.    O:   Blood pressure (!) 96/54, pulse 78, temperature 36.6 °C (97.9 °F), resp. rate 16, height 1.626 m (5' 4.02\"), weight 110.5 kg (243 lb 9.7 oz), last menstrual period 01/01/2018, SpO2 94 %, not currently breastfeeding.  Recent Labs      01/18/18 1951 01/19/18 0155 01/20/18   0422   SODIUM  136  133*  137   POTASSIUM  3.7  4.0  3.8   CHLORIDE  98  97  102   CO2  25  27  29   GLUCOSE  326*  392*  218*   BUN  17  18  11   CREATININE  1.34  1.40  1.20   CALCIUM  8.0*  7.4*  8.0*     Recent Labs      01/18/18 1951 01/19/18 0155  01/20/18   0422   WBC  11.8*  10.9*  8.3   RBC  3.61*  3.43*  3.24*   HEMOGLOBIN  9.7*  9.2*  8.8*   HEMATOCRIT  30.6*  29.0*  27.8*   MCV  84.8  84.5  85.8   MCH  26.9*  26.8*  27.2   MCHC  31.7*  31.7*  31.7*   RDW  53.7*  53.3*  55.8*   PLATELETCT  304  283  248   MPV  8.8*  9.0  9.0         Intake/Output Summary (Last 24 hours) at 01/20/18 0948  Last data filed at 01/20/18 0720   Gross per 24 hour   Intake             2575 ml   Output             2775 ml   Net             -200 ml       Exam:  Abdomen soft, benign.  Incisions clean, dry, intact.   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   Ambulate, IS.  Ice chips until passing flatus  Use PCA if needed  Discussed with family that she needs to ambulate  Labs in AM  "

## 2018-01-20 NOTE — PROGRESS NOTES
46 yo F  Full Code  NPO, ice chips ok  Admit 1/18- R renal tumor     Assessment done     A+O x4  Congolese speaking only   at bedside     2.5L NC 96%     PCA in use  Settings verified     Absent BS x4  R abd dressing CDI  R abd lap sites x3 CDI  R flank dressing CDI     Couch in place with statlocke  Bag dated and initialed  Couch care given     L upper arm 18g PIV CDI, flushes easily, no blood return present, saline locked     L wrist 20g PIV CDI, D5 1/2NS +20K running at 125 ml/hr     1+ edema to BLUE, elevated on pillows     Bed low and locked, call light and belongings in reach, hourly rounding in place     SCDs on, lovenox given  Ambulated in dougherty with staff assistance and FWW     Discussed POC     All needs met at this time

## 2018-01-20 NOTE — PROGRESS NOTES
Patient ambulated in dougherty 200 feet with staff assist and front wheel walker. Tolerated well. Patient is currently sitting up in chair. Family at bedside.

## 2018-01-21 LAB
ALBUMIN SERPL BCP-MCNC: 3.1 G/DL (ref 3.2–4.9)
ALBUMIN/GLOB SERPL: 1 G/DL
ALP SERPL-CCNC: 71 U/L (ref 30–99)
ALT SERPL-CCNC: 33 U/L (ref 2–50)
ANION GAP SERPL CALC-SCNC: 7 MMOL/L (ref 0–11.9)
AST SERPL-CCNC: 41 U/L (ref 12–45)
BASOPHILS # BLD AUTO: 0.2 % (ref 0–1.8)
BASOPHILS # BLD: 0.01 K/UL (ref 0–0.12)
BILIRUB SERPL-MCNC: 0.3 MG/DL (ref 0.1–1.5)
BUN SERPL-MCNC: 8 MG/DL (ref 8–22)
CALCIUM SERPL-MCNC: 8.4 MG/DL (ref 8.5–10.5)
CHLORIDE SERPL-SCNC: 102 MMOL/L (ref 96–112)
CO2 SERPL-SCNC: 26 MMOL/L (ref 20–33)
CREAT SERPL-MCNC: 0.9 MG/DL (ref 0.5–1.4)
EOSINOPHIL # BLD AUTO: 0.08 K/UL (ref 0–0.51)
EOSINOPHIL NFR BLD: 1.3 % (ref 0–6.9)
ERYTHROCYTE [DISTWIDTH] IN BLOOD BY AUTOMATED COUNT: 56 FL (ref 35.9–50)
GLOBULIN SER CALC-MCNC: 3.2 G/DL (ref 1.9–3.5)
GLUCOSE SERPL-MCNC: 187 MG/DL (ref 65–99)
HCT VFR BLD AUTO: 28.4 % (ref 37–47)
HGB BLD-MCNC: 8.7 G/DL (ref 12–16)
IMM GRANULOCYTES # BLD AUTO: 0.03 K/UL (ref 0–0.11)
IMM GRANULOCYTES NFR BLD AUTO: 0.5 % (ref 0–0.9)
LYMPHOCYTES # BLD AUTO: 1.34 K/UL (ref 1–4.8)
LYMPHOCYTES NFR BLD: 22.2 % (ref 22–41)
MCH RBC QN AUTO: 26.9 PG (ref 27–33)
MCHC RBC AUTO-ENTMCNC: 30.6 G/DL (ref 33.6–35)
MCV RBC AUTO: 87.9 FL (ref 81.4–97.8)
MONOCYTES # BLD AUTO: 0.32 K/UL (ref 0–0.85)
MONOCYTES NFR BLD AUTO: 5.3 % (ref 0–13.4)
NEUTROPHILS # BLD AUTO: 4.26 K/UL (ref 2–7.15)
NEUTROPHILS NFR BLD: 70.5 % (ref 44–72)
NRBC # BLD AUTO: 0 K/UL
NRBC BLD-RTO: 0 /100 WBC
PLATELET # BLD AUTO: 266 K/UL (ref 164–446)
PMV BLD AUTO: 9.1 FL (ref 9–12.9)
POTASSIUM SERPL-SCNC: 3.9 MMOL/L (ref 3.6–5.5)
PROT SERPL-MCNC: 6.3 G/DL (ref 6–8.2)
RBC # BLD AUTO: 3.23 M/UL (ref 4.2–5.4)
SODIUM SERPL-SCNC: 135 MMOL/L (ref 135–145)
WBC # BLD AUTO: 6 K/UL (ref 4.8–10.8)

## 2018-01-21 PROCEDURE — 85025 COMPLETE CBC W/AUTO DIFF WBC: CPT

## 2018-01-21 PROCEDURE — A9270 NON-COVERED ITEM OR SERVICE: HCPCS | Performed by: NURSE PRACTITIONER

## 2018-01-21 PROCEDURE — A9270 NON-COVERED ITEM OR SERVICE: HCPCS | Performed by: UROLOGY

## 2018-01-21 PROCEDURE — 700102 HCHG RX REV CODE 250 W/ 637 OVERRIDE(OP): Performed by: NURSE PRACTITIONER

## 2018-01-21 PROCEDURE — 700102 HCHG RX REV CODE 250 W/ 637 OVERRIDE(OP): Performed by: UROLOGY

## 2018-01-21 PROCEDURE — 80053 COMPREHEN METABOLIC PANEL: CPT

## 2018-01-21 PROCEDURE — 700101 HCHG RX REV CODE 250: Performed by: UROLOGY

## 2018-01-21 PROCEDURE — 770006 HCHG ROOM/CARE - MED/SURG/GYN SEMI*

## 2018-01-21 PROCEDURE — 36415 COLL VENOUS BLD VENIPUNCTURE: CPT

## 2018-01-21 PROCEDURE — 700111 HCHG RX REV CODE 636 W/ 250 OVERRIDE (IP): Performed by: UROLOGY

## 2018-01-21 RX ORDER — HYDROCODONE BITARTRATE AND ACETAMINOPHEN 5; 325 MG/1; MG/1
2 TABLET ORAL EVERY 6 HOURS PRN
Status: DISCONTINUED | OUTPATIENT
Start: 2018-01-21 | End: 2018-01-23 | Stop reason: HOSPADM

## 2018-01-21 RX ORDER — HYDROCODONE BITARTRATE AND ACETAMINOPHEN 5; 325 MG/1; MG/1
1-2 TABLET ORAL EVERY 6 HOURS PRN
Status: DISCONTINUED | OUTPATIENT
Start: 2018-01-21 | End: 2018-01-21

## 2018-01-21 RX ORDER — HYDROCODONE BITARTRATE AND ACETAMINOPHEN 5; 325 MG/1; MG/1
1 TABLET ORAL EVERY 6 HOURS PRN
Status: DISCONTINUED | OUTPATIENT
Start: 2018-01-21 | End: 2018-01-23 | Stop reason: HOSPADM

## 2018-01-21 RX ADMIN — CHLORTHALIDONE 50 MG: 50 TABLET ORAL at 20:57

## 2018-01-21 RX ADMIN — ENOXAPARIN SODIUM 40 MG: 100 INJECTION SUBCUTANEOUS at 10:11

## 2018-01-21 RX ADMIN — POTASSIUM CHLORIDE, DEXTROSE MONOHYDRATE AND SODIUM CHLORIDE: 150; 5; 450 INJECTION, SOLUTION INTRAVENOUS at 18:15

## 2018-01-21 RX ADMIN — ACETAMINOPHEN 1000 MG: 500 TABLET ORAL at 06:56

## 2018-01-21 RX ADMIN — POTASSIUM CHLORIDE, DEXTROSE MONOHYDRATE AND SODIUM CHLORIDE: 150; 5; 450 INJECTION, SOLUTION INTRAVENOUS at 10:57

## 2018-01-21 RX ADMIN — ACETAMINOPHEN 1000 MG: 500 TABLET ORAL at 17:49

## 2018-01-21 RX ADMIN — POTASSIUM CHLORIDE, DEXTROSE MONOHYDRATE AND SODIUM CHLORIDE: 150; 5; 450 INJECTION, SOLUTION INTRAVENOUS at 23:44

## 2018-01-21 RX ADMIN — AMLODIPINE BESYLATE 5 MG: 5 TABLET ORAL at 20:57

## 2018-01-21 RX ADMIN — HYDROCODONE BITARTRATE AND ACETAMINOPHEN 2 TABLET: 5; 325 TABLET ORAL at 23:39

## 2018-01-21 RX ADMIN — HYDROCODONE BITARTRATE AND ACETAMINOPHEN 2 TABLET: 5; 325 TABLET ORAL at 11:09

## 2018-01-21 RX ADMIN — POTASSIUM CHLORIDE, DEXTROSE MONOHYDRATE AND SODIUM CHLORIDE: 150; 5; 450 INJECTION, SOLUTION INTRAVENOUS at 02:38

## 2018-01-21 RX ADMIN — ACETAMINOPHEN 1000 MG: 500 TABLET ORAL at 10:57

## 2018-01-21 ASSESSMENT — PAIN SCALES - GENERAL
PAINLEVEL_OUTOF10: 9
PAINLEVEL_OUTOF10: 7
PAINLEVEL_OUTOF10: 5

## 2018-01-21 ASSESSMENT — PAIN SCALES - WONG BAKER: WONGBAKER_NUMERICALRESPONSE: HURTS JUST A LITTLE BIT

## 2018-01-21 NOTE — PROGRESS NOTES
Report received from day RN and student RN. Assumed care at 1915. A/O x4. Family at bedside. No attempt to get OOB at this time. Discuss with pt to use call light when needing assistance. Pt is primarily Mozambican speaking, understands a little bit of english.  Reports pain 6/10 out of 0-10 pain scale at this time. PCA morphine in use.  No c/o of N/V. NPO with ice chips.  (+) flatus, (-) BM, hypoactive BS, (+) output in lamb, yellow clear in color.  Noted: R side flank and R lap sites x 3, gauze, cloth tape, CDI  No ambulation at this time, pt was able to get to chair during dayshift.   SCD's on, vss  Reviewed POC for evening. All questions answered.   Call light within reach. Bed at lowest position w/ bed brakes locked. Hourly rounding in place.

## 2018-01-21 NOTE — CARE PLAN
Problem: Safety  Goal: Will remain free from injury  Outcome: PROGRESSING AS EXPECTED  Bed low and locked, call light and belongings in reach, hourly rounding in place,  at bedside, pt calls appropriately for assistance    Problem: Mobility  Goal: Risk for activity intolerance will decrease  Outcome: PROGRESSING AS EXPECTED  Pt up to bathroom to void after lamb removal, ambulating in dougherty with staff encouragement and assistance with FWW

## 2018-01-21 NOTE — PROGRESS NOTES
48 yo F  Full Code  Diet advanced to clears  Admit 1/18- R renal tumor     Assessment done     A+O x4  Slovenian speaking only   at bedside     2.5L NC 96%     PCA D/C per active order     Absent BS x4  R abd dressing CDI  R abd lap sites x3 CDI  R flank dressing CDI  + gas  - BM     Couch D/C per active order    L upper arm 18g PIV CDI, flushes easily, no blood return present, saline locked     L wrist 20g PIV CDI, D5 1/2NS +20K running at 125 ml/hr     1+ edema to BLUE, elevated on pillows     Bed low and locked, call light and belongings in reach, hourly rounding in place     SCDs on, lovenox given    Up to bathroom with FWW then back to chair  +void    Complete linen change     Discussed POC     All needs met at this time

## 2018-01-21 NOTE — PROGRESS NOTES
"Urology Progress Note    Post op Day # 3    Overnight Events: None    S: No fevers, chills, nausea or vomiting.  Passing  Flatus. Ambulating with encouragement. Has not been using PCA.    O:   Blood pressure 121/67, pulse 75, temperature 36.2 °C (97.1 °F), resp. rate 16, height 1.626 m (5' 4.02\"), weight 110.5 kg (243 lb 9.7 oz), last menstrual period 01/01/2018, SpO2 96 %, not currently breastfeeding.  Recent Labs      01/19/18   0155  01/20/18   0422  01/21/18   0341   SODIUM  133*  137  135   POTASSIUM  4.0  3.8  3.9   CHLORIDE  97  102  102   CO2  27  29  26   GLUCOSE  392*  218*  187*   BUN  18  11  8   CREATININE  1.40  1.20  0.90   CALCIUM  7.4*  8.0*  8.4*     Recent Labs      01/19/18   0155  01/20/18   0422  01/21/18   0341   WBC  10.9*  8.3  6.0   RBC  3.43*  3.24*  3.23*   HEMOGLOBIN  9.2*  8.8*  8.7*   HEMATOCRIT  29.0*  27.8*  28.4*   MCV  84.5  85.8  87.9   MCH  26.8*  27.2  26.9*   MCHC  31.7*  31.7*  30.6*   RDW  53.3*  55.8*  56.0*   PLATELETCT  283  248  266   MPV  9.0  9.0  9.1         Intake/Output Summary (Last 24 hours) at 01/21/18 0847  Last data filed at 01/21/18 0400   Gross per 24 hour   Intake             2504 ml   Output             3100 ml   Net             -596 ml       Exam:  Abdomen soft, benign.  Incisions clean, dry, intact.   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   Ambulate, IS.  DC lamb catheter  Start on clears then ADT  DC PCA. Can use oral pain medication  "

## 2018-01-21 NOTE — CARE PLAN
Problem: Venous Thromboembolism (VTW)/Deep Vein Thrombosis (DVT) Prevention:  Goal: Patient will participate in Venous Thrombosis (VTE)/Deep Vein Thrombosis (DVT)Prevention Measures  Outcome: PROGRESSING AS EXPECTED  SCD's on throughout the night.    Problem: Mobility  Goal: Risk for activity intolerance will decrease  Outcome: PROGRESSING SLOWER THAN EXPECTED  Pt ambulated x1 for night shift. Assistance of 2. Ambulated 100 feet. Encourage to be more active as tolerated during day.

## 2018-01-22 LAB
ANION GAP SERPL CALC-SCNC: 5 MMOL/L (ref 0–11.9)
BASOPHILS # BLD AUTO: 0.1 % (ref 0–1.8)
BASOPHILS # BLD: 0.01 K/UL (ref 0–0.12)
BUN SERPL-MCNC: 8 MG/DL (ref 8–22)
CALCIUM SERPL-MCNC: 8.6 MG/DL (ref 8.5–10.5)
CHLORIDE SERPL-SCNC: 103 MMOL/L (ref 96–112)
CO2 SERPL-SCNC: 26 MMOL/L (ref 20–33)
CREAT SERPL-MCNC: 0.96 MG/DL (ref 0.5–1.4)
EOSINOPHIL # BLD AUTO: 0.15 K/UL (ref 0–0.51)
EOSINOPHIL NFR BLD: 2.1 % (ref 0–6.9)
ERYTHROCYTE [DISTWIDTH] IN BLOOD BY AUTOMATED COUNT: 53.4 FL (ref 35.9–50)
GLUCOSE SERPL-MCNC: 170 MG/DL (ref 65–99)
HCT VFR BLD AUTO: 28.8 % (ref 37–47)
HGB BLD-MCNC: 9 G/DL (ref 12–16)
IMM GRANULOCYTES # BLD AUTO: 0.02 K/UL (ref 0–0.11)
IMM GRANULOCYTES NFR BLD AUTO: 0.3 % (ref 0–0.9)
LYMPHOCYTES # BLD AUTO: 1.46 K/UL (ref 1–4.8)
LYMPHOCYTES NFR BLD: 20.4 % (ref 22–41)
MCH RBC QN AUTO: 26.9 PG (ref 27–33)
MCHC RBC AUTO-ENTMCNC: 31.3 G/DL (ref 33.6–35)
MCV RBC AUTO: 86.2 FL (ref 81.4–97.8)
MONOCYTES # BLD AUTO: 0.27 K/UL (ref 0–0.85)
MONOCYTES NFR BLD AUTO: 3.8 % (ref 0–13.4)
NEUTROPHILS # BLD AUTO: 5.24 K/UL (ref 2–7.15)
NEUTROPHILS NFR BLD: 73.3 % (ref 44–72)
NRBC # BLD AUTO: 0 K/UL
NRBC BLD-RTO: 0 /100 WBC
PLATELET # BLD AUTO: 289 K/UL (ref 164–446)
PMV BLD AUTO: 8.9 FL (ref 9–12.9)
POTASSIUM SERPL-SCNC: 3.8 MMOL/L (ref 3.6–5.5)
RBC # BLD AUTO: 3.34 M/UL (ref 4.2–5.4)
SODIUM SERPL-SCNC: 134 MMOL/L (ref 135–145)
WBC # BLD AUTO: 7.2 K/UL (ref 4.8–10.8)

## 2018-01-22 PROCEDURE — 85025 COMPLETE CBC W/AUTO DIFF WBC: CPT

## 2018-01-22 PROCEDURE — A9270 NON-COVERED ITEM OR SERVICE: HCPCS | Performed by: NURSE PRACTITIONER

## 2018-01-22 PROCEDURE — A9270 NON-COVERED ITEM OR SERVICE: HCPCS | Performed by: UROLOGY

## 2018-01-22 PROCEDURE — 770006 HCHG ROOM/CARE - MED/SURG/GYN SEMI*

## 2018-01-22 PROCEDURE — 700102 HCHG RX REV CODE 250 W/ 637 OVERRIDE(OP): Performed by: NURSE PRACTITIONER

## 2018-01-22 PROCEDURE — 700111 HCHG RX REV CODE 636 W/ 250 OVERRIDE (IP): Performed by: UROLOGY

## 2018-01-22 PROCEDURE — 700102 HCHG RX REV CODE 250 W/ 637 OVERRIDE(OP): Performed by: UROLOGY

## 2018-01-22 PROCEDURE — 80048 BASIC METABOLIC PNL TOTAL CA: CPT

## 2018-01-22 PROCEDURE — 36415 COLL VENOUS BLD VENIPUNCTURE: CPT

## 2018-01-22 PROCEDURE — 700101 HCHG RX REV CODE 250: Performed by: UROLOGY

## 2018-01-22 RX ADMIN — POTASSIUM CHLORIDE, DEXTROSE MONOHYDRATE AND SODIUM CHLORIDE: 150; 5; 450 INJECTION, SOLUTION INTRAVENOUS at 05:40

## 2018-01-22 RX ADMIN — CHLORTHALIDONE 50 MG: 50 TABLET ORAL at 20:24

## 2018-01-22 RX ADMIN — POTASSIUM CHLORIDE, DEXTROSE MONOHYDRATE AND SODIUM CHLORIDE: 150; 5; 450 INJECTION, SOLUTION INTRAVENOUS at 18:46

## 2018-01-22 RX ADMIN — ENOXAPARIN SODIUM 40 MG: 100 INJECTION SUBCUTANEOUS at 09:45

## 2018-01-22 RX ADMIN — HYDROCODONE BITARTRATE AND ACETAMINOPHEN 2 TABLET: 5; 325 TABLET ORAL at 20:25

## 2018-01-22 RX ADMIN — HYDROCODONE BITARTRATE AND ACETAMINOPHEN 2 TABLET: 5; 325 TABLET ORAL at 14:10

## 2018-01-22 RX ADMIN — AMLODIPINE BESYLATE 5 MG: 5 TABLET ORAL at 20:25

## 2018-01-22 RX ADMIN — HYDROCODONE BITARTRATE AND ACETAMINOPHEN 2 TABLET: 5; 325 TABLET ORAL at 05:38

## 2018-01-22 ASSESSMENT — PAIN SCALES - GENERAL
PAINLEVEL_OUTOF10: 8
PAINLEVEL_OUTOF10: 8
PAINLEVEL_OUTOF10: 7
PAINLEVEL_OUTOF10: 4

## 2018-01-22 NOTE — PROGRESS NOTES
Assumed care of pt at 1900, report given.  A+O x 4, VSS, and RA.  Rt flank surgical incision and 3 lap sites all covered with dry gauze and tape; CDI.   Pt tolerating clear liquid diet, denies advancement; no appetite.  +voild, +BM.   Pt ambulates with a 1 assist and FWW to restroom. Refusing to walk halls due to pain. Medicated per MAR with Norco; tolerating well.    Pt educated on use of IS, only able to pull 500. Constant reminders given with encouragement.   Pt updated on POC and all questions answered at this time.  Bed in lowest position, call light within reach, and no current needs.

## 2018-01-22 NOTE — PROGRESS NOTES
Assumed care at 0700. Received report from night shift RN. Bedside report completed. AOx4.    Denied pain.  Denies nausea.  Tolerating clear liquids, but has poor appetite, increased diet per active order which pt was ok with trying.   +voiding. +BM this morning.    IVF infusing. Ambulating with 1 assist.   Pt call light and belongings within reach, fall precautions in place.  at bedside.

## 2018-01-22 NOTE — CARE PLAN
Problem: Safety  Goal: Will remain free from injury  Outcome: PROGRESSING AS EXPECTED  Proper fall precautions in place. Call light within reach and encouraged to use. Hourly rounding in practice. Bed alarm in refused.     Problem: Skin Integrity  Goal: Risk for impaired skin integrity will decrease  Outcome: PROGRESSING AS EXPECTED  Pt demonstrates ability to turn self in bed without assistance of staff. Understands importance in prevention of breakdown, ulcers, and potential infection. Hourly rounding in effect.

## 2018-01-22 NOTE — PROGRESS NOTES
Discussed diet with pt, denies n/v, declines advance to full liquid. Stated she wanted to continue with clear liquids for dinner. Possible advance to full liquid in AM.

## 2018-01-22 NOTE — PROGRESS NOTES
"Urology Progress Note    Post op Day # 4    Overnight Events: None    S: No fevers, chills, nausea or vomiting.  +flatus.  +BM.  Not interested in ambulating but will ambulate with encouragement.  Good pain control.  Tolerating diet.      O:   Blood pressure 104/60, pulse 66, temperature 36.4 °C (97.6 °F), resp. rate 16, height 1.626 m (5' 4.02\"), weight 110.5 kg (243 lb 9.7 oz), last menstrual period 01/01/2018, SpO2 98 %, not currently breastfeeding.  Recent Labs      01/20/18   0422  01/21/18   0341  01/22/18   0425   SODIUM  137  135  134*   POTASSIUM  3.8  3.9  3.8   CHLORIDE  102  102  103   CO2  29  26  26   GLUCOSE  218*  187*  170*   BUN  11  8  8   CREATININE  1.20  0.90  0.96   CALCIUM  8.0*  8.4*  8.6     Recent Labs      01/20/18   0422  01/21/18   0341  01/22/18   0425   WBC  8.3  6.0  7.2   RBC  3.24*  3.23*  3.34*   HEMOGLOBIN  8.8*  8.7*  9.0*   HEMATOCRIT  27.8*  28.4*  28.8*   MCV  85.8  87.9  86.2   MCH  27.2  26.9*  26.9*   MCHC  31.7*  30.6*  31.3*   RDW  55.8*  56.0*  53.4*   PLATELETCT  248  266  289   MPV  9.0  9.1  8.9*         Intake/Output Summary (Last 24 hours) at 01/22/18 1050  Last data filed at 01/22/18 0400   Gross per 24 hour   Intake             2560 ml   Output             3000 ml   Net             -440 ml       Exam:  Abdomen soft, benign.  Incisions clean, dry, intact.         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  Must Ambulate, IS.  2.  Diet as tolerated  3.  Anticipate home this afternoon.   4.  Will arrange for outpatient anticoagulation  "

## 2018-01-23 VITALS
SYSTOLIC BLOOD PRESSURE: 107 MMHG | HEIGHT: 64 IN | TEMPERATURE: 97.6 F | WEIGHT: 243.61 LBS | BODY MASS INDEX: 41.59 KG/M2 | OXYGEN SATURATION: 99 % | HEART RATE: 66 BPM | DIASTOLIC BLOOD PRESSURE: 53 MMHG | RESPIRATION RATE: 16 BRPM

## 2018-01-23 PROBLEM — G89.28 CHRONIC POSTOPERATIVE PAIN: Status: ACTIVE | Noted: 2018-01-23

## 2018-01-23 PROCEDURE — A9270 NON-COVERED ITEM OR SERVICE: HCPCS | Performed by: NURSE PRACTITIONER

## 2018-01-23 PROCEDURE — 700102 HCHG RX REV CODE 250 W/ 637 OVERRIDE(OP): Performed by: NURSE PRACTITIONER

## 2018-01-23 PROCEDURE — 700101 HCHG RX REV CODE 250: Performed by: UROLOGY

## 2018-01-23 RX ORDER — HYDROCODONE BITARTRATE AND ACETAMINOPHEN 5; 325 MG/1; MG/1
2 TABLET ORAL EVERY 6 HOURS PRN
Qty: 15 TAB | Refills: 0 | Status: SHIPPED | OUTPATIENT
Start: 2018-01-23 | End: 2018-01-26

## 2018-01-23 RX ADMIN — POTASSIUM CHLORIDE, DEXTROSE MONOHYDRATE AND SODIUM CHLORIDE: 150; 5; 450 INJECTION, SOLUTION INTRAVENOUS at 01:42

## 2018-01-23 RX ADMIN — HYDROCODONE BITARTRATE AND ACETAMINOPHEN 2 TABLET: 5; 325 TABLET ORAL at 09:02

## 2018-01-23 RX ADMIN — HYDROCODONE BITARTRATE AND ACETAMINOPHEN 2 TABLET: 5; 325 TABLET ORAL at 02:29

## 2018-01-23 ASSESSMENT — PAIN SCALES - GENERAL
PAINLEVEL_OUTOF10: 3
PAINLEVEL_OUTOF10: 6
PAINLEVEL_OUTOF10: 8

## 2018-01-23 NOTE — PROGRESS NOTES
Pt discharged with RN in W/C. Prescriptions were given x 2. D/C instructions signed and placed in chart. Chart given to U/C. Charge notified. Medications from pts drawer returned to pharmacy.  Controlled substance use informed consent complete.

## 2018-01-23 NOTE — PROGRESS NOTES
Assumed care at 0700. Received report from night shift RN. Bedside report completed. AOx4.    C/o pain-medicated per MAR.  Denies nausea.  Tolerating diet, but has poor appetite.   +voiding. LBM 1/22.    IVF infusing. Ambulating with standby assist.   Pt call light and belongings within reach, fall precautions in place. [place] Speaking.  at bedside.

## 2018-01-23 NOTE — DISCHARGE INSTRUCTIONS
Discharge Instructions    Discharged to home by car with relative. Discharged via wheelchair, hospital escort: Yes.  Special equipment needed: Not Applicable    Be sure to schedule a follow-up appointment with your primary care doctor or any specialists as instructed.     Discharge Plan:   Influenza Vaccine Indication: Patient Refuses    I understand that a diet low in cholesterol, fat, and sodium is recommended for good health. Unless I have been given specific instructions below for another diet, I accept this instruction as my diet prescription.   Other diet: Regular diet as tolerated  Special Instructions:   Monitor for signs and symptoms of infection (fever, chills, nausea, vomiting)  Monitor incisions for swelling, redness, or drainage    Controle si hay signos y síntomas de infección (fiebre, escalofríos, náuseas, vómitos)  Monitoree incisiones para hinchazón, enrojecimiento o drenaje          Depression / Suicide Risk    As you are discharged from this [facility], it is important to learn how to keep safe from harming yourself.    Recognize the warning signs:  · Abrupt changes in personality, positive or negative- including increase in energy   · Giving away possessions  · Change in eating patterns- significant weight changes-  positive or negative  · Change in sleeping patterns- unable to sleep or sleeping all the time   · Unwillingness or inability to communicate  · Depression  · Unusual sadness, discouragement and loneliness  · Talk of wanting to die  · Neglect of personal appearance   · Rebelliousness- reckless behavior  · Withdrawal from people/activities they love  · Confusion- inability to concentrate     If you or a loved one observes any of these behaviors or has concerns about self-harm, here's what you can do:  · Talk about it- your feelings and reasons for harming yourself  · Remove any means that you might use to hurt yourself (examples: pills, rope, extension cords,  firearm)  · Get professional help from the community (Mental Health, Substance Abuse, psychological counseling)  · Do not be alone:Call your Safe Contact- someone whom you trust who will be there for you.  · Call your local CRISIS HOTLINE 290-9751 or [phone]  · Call your local Children's Mobile Crisis Response Team [place] (094) 231-4494 or www.[employer] ([employer])  · Call the toll free National Suicide Prevention Hotlines   · National Suicide Prevention Lifeline 551-556-MUVI (0838)  · [facility] 800-SUICIDE (176-2327)        Open Nephrectomy  Open nephrectomy is a surgical procedure to remove a kidney. Open nephrectomy is done through the side or front of your belly (abdomen). In this procedure, you may have your entire kidney and some surrounding structures removed (radical nephrectomy), or you may have only the damaged or diseased part of your kidney removed (partial nephrectomy).  You may need this surgery if your kidney is severely damaged from kidney disease, infection, or cancer. You may also need this procedure if you were born with an abnormal kidney or if you are donating a healthy kidney.  LET YOUR HEALTH CARE PROVIDER KNOW ABOUT:  · Any allergies you have.  · All medicines you are taking, including vitamins, herbs, eye drops, creams, and over-the-counter medicines.  · Previous problems you or members of your family have had with the use of anesthetics.  · Any blood disorders you have.  · Previous surgeries you have had.  · Medical conditions you have.  RISKS AND COMPLICATIONS  Generally, this is a safe procedure. However, problems may occur, including:  · Pneumonia.  · Bleeding.  · Infection.  · A blood clot that forms in your leg and travels to your lung.  · Kidney failure, if a problem develops in your other kidney.  BEFORE THE PROCEDURE  · Ask your health care provider about:  ¨ Changing or stopping your regular medicines. This is especially important if you are taking diabetes  medicines or blood thinners.  ¨ Taking medicines such as aspirin and ibuprofen. These medicines can thin your blood. Do not take these medicines before your procedure if your health care provider instructs you not to.  · Do not use any tobacco products, including cigarettes, chewing tobacco, or electronic cigarettes. If you need help quitting, ask your health care provider.  · Your health care provider may instruct you to do breathing exercises before the procedure to help prevent pneumonia. Do these exercises as directed by your health care provider.  · You may need to have blood drawn (type and cross) in case you need to receive blood (transfusion) during the procedure.  · Follow your health care provider's instructions about eating or drinking restrictions.  · Plan to have someone take you home after the procedure.  PROCEDURE  · An IV tube will be inserted into a vein.  · You will be given a medicine that makes you fall asleep (general anesthetic).  · Your abdomen will be cleaned with a germ-killing solution (antiseptic).  · A tube will be inserted through your urethra into your bladder (urinary catheter). This will drain urine during the procedure.  · Your surgeon will make an incision in the front or side of your abdomen.  · The muscles, fat, and tissues under your skin will be moved.  · It may be necessary to remove your lower rib to get to your kidney.  · If you are having a radical nephrectomy:  ¨ Part of the tube that carries urine from your kidney to your bladder will be removed.  ¨ All of the blood vessels that attach to your kidney will be  and tied off.  ¨ Your kidney will be removed.  · If you are having a partial nephrectomy, only the diseased part of your kidney will be removed.  · Bleeding in the surgical area will be controlled.  · The surgical incision will be closed with stitches (sutures) or staples.  · A bandage (dressing) will be placed over the incision.  The procedure may vary among  health care providers and hospitals.  AFTER THE PROCEDURE   · You will stay in a recovery area until the anesthetic wears off.  · You will be given pain medicine as needed.  · Your blood pressure, heart rate, breathing rate, and blood oxygen level will be monitored often until the medicines you were given have worn off.  · Your fluid intake and output will be monitored.  · You will be encouraged to walk as soon as you can. Your health care providers will help you.  · You will be encouraged to do deep breathing exercises.  · Your urinary catheter will be removed.  · Your IV tube will be removed when you are eating well and you no longer require IV pain medicine or fluids.     This information is not intended to replace advice given to you by your health care provider. Make sure you discuss any questions you have with your health care provider.     Document Released: 08/10/2005 Document Revised: 01/08/2016 Document Reviewed: 08/26/2015  [employer] Interactive Patient Education ©2016 Elsevier Inc.      Nefrectomía abierta  (Open Nephrectomy)  La nefrectomía abierta es un procedimiento quirúrgico para extirpar un riñón. Se realiza por el costado o la parte delantera del vientre (abdomen). En belen procedimiento, le pueden extirpar un riñón entero y algunas estructuras circundantes (nefrectomía radical) o solo la parte dañada o enferma del riñón (nefrectomía parcial).  Puede necesitar esta cirugía si tiene el riñón con un daño grave por enfermedad renal, infección o cáncer. También puede necesitar belen procedimiento si nació con un riñón anormal o si devaughn un riñón saludable.  INFORME A COX MÉDICO:  · Cualquier alergia que tenga.  · Todos los medicamentos que utiliza, incluidos vitaminas, hierbas, gotas oftálmicas, cremas y medicamentos de venta amber.  · Problemas previos que usted o los miembros de cox sheri hayan tenido con el uso de anestésicos.  · Enfermedades de la nasreen que tenga.  · Si tiene cirugías  previas.  · Enfermedades que tenga.  RIESGOS Y COMPLICACIONES  En general, se trata de un procedimiento seguro. Sin embargo, pueden presentarse problemas, por ejemplo:  · Neumonía.  · Hemorragia.  · Infección.  · Un coágulo sanguíneo que se forma en la pierna y se traslada a los pulmones.  · Insuficiencia renal, si se presenta algún problema en el otro riñón.  ANTES DEL PROCEDIMIENTO  · Consulte a cox médico acerca de estos temas:  ¨ Cambiar o suspender los medicamentos que irina habitualmente. [place] es muy importante si irina medicamentos para la diabetes o anticoagulantes.  ¨ Lucía medicamentos, jaison aspirina e ibuprofeno. Estos medicamentos pueden tener un efecto anticoagulante en la nasreen. No tome estos medicamentos antes del procedimiento si el médico le indica que no lo justin.  · No consuma ningún producto que contenga tabaco, lo que incluye cigarrillos, tabaco de mascar o cigarrillos electrónicos. Si necesita ayuda para dejar de fumar, consulte al médico.  · El médico le indicará que realice ejercicios de respiración antes del procedimiento para prevenir la neumonía. Justin estos ejercicios jaison se lo haya indicado el médico.  · Es posible que le extraigan nasreen (prueba de tipo y compatibilidad cruzada) en tee de que necesite recibir nasreen (transfusión) hans el procedimiento.  · Siga las indicaciones del médico respecto de las restricciones para las comidas o las bebidas.  · Justin planes para que tarik persona lo lleve de vuelta a cox casa después del procedimiento.  PROCEDIMIENTO  · Le colocarán tarik vía intravenosa (IV) en tarik vena.  · Le administrarán un medicamento que lo hará dormir (anestesia general).  · Le limpiarán el abdomen con tarik solución que destruirá las bacterias (antiséptico).  · Le insertarán tarik sonda en la uretra y en la vejiga (catéter urinario) para drenar la orina hans el procedimiento.  · El cirujano realizará tarik incisión en la parte delantera del abdomen.  · Los músculos, grasa y  tejidos debajo de la piel se moverán.  · Puede ser necesario extraer la estrada flotante para tener acceso al riñón.  · Si le realizan tarik nefrectomía radical:  ¨ Le extirparán parte del tubo que transporta la orina desde el riñón hasta la vejiga.  ¨ Se separarán y ligarán todos los vasos sanguíneos que están conectados al riñón.  ¨ Le extirparán el riñón.  · Si le realizan tarik nefrectomía parcial, solo le extirparán la parte enferma del riñón.  · Se controlará el sangrado en la jeannie de la cirugía.  · La incisión quirúrgica se cerrará con grapas o puntos (suturas).  · Se colocará un vendaje (apósito) sobre la incisión.  Flory procedimiento puede variar según el médico y el hospital.  DESPUÉS DEL PROCEDIMIENTO   · Debe permanecer en un área de recuperación hasta que desaparezca el efecto de la anestesia.  · Le darán medicamentos para el dolor si los necesita.  · Le controlarán con frecuencia la presión arterial, la frecuencia cardíaca, la frecuencia respiratoria y el nivel de oxígeno en la nasreen hasta que haya desaparecido el efecto de los medicamentos administrados.  · Se supervisará la entrada y salida de líquido.  · Lo alentarán a caminar lo más pronto posible. El médico lo ayudará.  · Lo alentarán a realizar ejercicios de respiración profunda.  · Le retirarán el catéter urinario.  · Le retirarán la vía intravenosa (IV) cuando esté comiendo reji y ya no necesite líquido o analgésicos por vía intravenosa.     Esta información no tiene jaison fin reemplazar el consejo del médico. Asegúrese de hacerle al médico cualquier pregunta que tenga.     Document Released: 10/08/2014 Document Revised: 01/08/2016  [employer] Interactive Patient Education ©2016 Elsevier Inc.      Open Nephrectomy, Care After  Refer to this sheet in the next few weeks. These instructions provide you with information abput caring for yourself after your procedure. Your health care provider may also give you more specific instructions. Your treatment  has been planned according to current medical practices, but problems sometimes occur. Call your health care provider if you have any problems or questions after your procedure.  WHAT TO EXPECT AFTER THE PROCEDURE  After your procedure, it is common to have:  · Pain in your side and belly (abdomen). Pain may be worse when you cough or breathe deeply.  · Sore and weak muscles. It may be difficult to get up if you are sitting or lying down.  · Trouble finding a comfortable sleeping position.  · Numbness in the area around your incision.  HOME CARE INSTRUCTIONS  · Take medicines only as directed by your health care provider.  · Do not drive or operate heavy machinery while taking pain medicine.  · Do not take baths, swim, or use a hot tub until your health care provider approves.  · There are many different ways to close and cover an incision, including stitches (sutures), skin glue, and adhesive strips. Follow your health care provider's instructions about:  ¨ Incision care.  ¨ Bandage (dressing) changes and removal.  ¨ Incision closure removal.  · Check your incision every day for signs of infection. Watch for:  ¨ Redness, swelling, or pain.  ¨ Fluid, blood, or pus.  · It may help to:  ¨ Hold a pillow over your belly if you need to cough.  ¨ Sleep in a chair until your muscles are stronger and you are less sore.  · Do your deep breathing exercises as directed by your health care provider.  · Try to walk a little bit every day as directed by your health care provider. Avoid any heavy activity. Ask your health care provider when you can return to your normal activities, such as work.  · Do not lift anything that is heavier than a gallon of milk until your health care provider approves.  · Keep all follow-up visits as directed by your health care provider. This is important.  SEEK MEDICAL CARE IF:  · You have a fever.  · Your pain is not controlled with medicine.  · You have a cough.  · You are short of breath.  · You  vomit.  · You have constipation or diarrhea.  SEEK IMMEDIATE MEDICAL CARE IF:  · You have severe pain.  · You have chest pain.  · You have difficulty breathing.  · You have redness, swelling, or pain in your incision area.  · You have fluid, blood, or pus coming from your incision.  · You are unable to urinate.     This information is not intended to replace advice given to you by your health care provider. Make sure you discuss any questions you have with your health care provider.     Document Released: 07/07/2006 Document Revised: 01/08/2016 Document Reviewed: 08/26/2015  [employer] Interactive Patient Education ©2016 Elsevier Inc.      Nefrectomía abierta, cuidados posteriores  (Open Nephrectomy, Care After)  Siga estas instrucciones hans las próximas semanas. Estas indicaciones le proporcionan información acerca de cómo deberá cuidarse después del procedimiento. El médico también podrá darle instrucciones más específicas. El tratamiento [place] sido planificado según las prácticas médicas actuales, leatha en algunos casos pueden ocurrir problemas. Comuníquese con el médico si tiene algún problema o tiene dudas después del procedimiento.  QUÉ ESPERAR DESPUÉS DEL PROCEDIMIENTO  Después del procedimiento, es común tener los siguientes síntomas:  · Dolor en el costado y el vientre (abdomen). El dolor puede empeorar al toser o respirar profundamente.  · Músculos doloridos y débiles. Le puede resultar difícil levantarse si está sentado o acostado.  · Problemas para encontrar tarik posición cómoda para dormir.  · Adormecimiento en la jeannie alrededor de la incisión.  INSTRUCCIONES PARA EL CUIDADO EN EL HOGAR  · [place] los medicamentos solamente jaison se lo haya indicado el médico.  · No conduzca ni opere maquinaria pesada mientras irina analgésicos.  · No tome gregorio de inmersión, no nade ni use el jacuzzi hasta que el médico lo autorice.  · Hay muchas maneras distintas de cerrar y cubrir tarik incisión, entre ellas, puntos (suturas),  pegamento cutáneo y tiras adhesivas. Siga las instrucciones del médico con respecto a lo siguiente:  ¨ Cuidar la herida.  ¨ Cambiar y retirar el vendaje.  ¨ Quitar el cierre de la incisión.  · Controle la incisión todos los días para detectar signos de infección. Esté atento a lo siguiente:  ¨ Dolor, hinchazón o enrojecimiento.  ¨ Líquido, nsareen o pus.  · Puede ser de ayuda:  ¨ Colóquese tarik almohada sobre el vientre si necesita toser.  ¨ Duerma en tarik silla hasta que los músculos estén más priscila y usted se sienta menos dolorido.  · Justin los ejercicios de respiración que le haya indicado el médico.  · Trate de caminar un poco cada día según las indicaciones de cox médico. Evite la actividad intensa. Pregúntele al médico cuándo puede retomar carlene actividades normales, jaison el trabajo.  · No levante ningún objeto que pese más que un galón de leche (4,5 kg) hasta que el médico lo autorice.  · Concurra a todas las visitas de control jaison se lo haya indicado el médico. [place] es importante.  SOLICITE ATENCIÓN MÉDICA SI:  · Tiene fiebre.  · El dolor no se joss con los medicamentos.  · Tiene tos.  · Comienza a sentir falta de aire.  · Vomita.  · Sufre estreñimiento o diarrea.  SOLICITE ATENCIÓN MÉDICA DE INMEDIATO SI:  · Siente dolor intenso.  · Siente dolor en el pecho.  · Tiene dificultad para respirar.  · Tiene enrojecimiento, hinchazón o dolor en la jeannie de la incisión.  · Observa líquido, nasreen o pus que emanan de la incisión.  · No puede orinar.     Esta información no tiene jaison fin reemplazar el consejo del médico. Asegúrese de hacerle al médico cualquier pregunta que tenga.     Document Released: 06/07/2011 Document Revised: 01/08/2016  Elsevier Interactive Patient Education ©2016 [employer]    Rivaroxaban oral tablets  What is this medicine?  RIVAROXABAN (ri va FABBY a ban) is an anticoagulant (blood thinner). It is used to treat blood clots in the lungs or in the veins. It is also used after knee or hip  surgeries to prevent blood clots. It is also used to lower the chance of stroke in people with a medical condition called atrial fibrillation.  This medicine may be used for other purposes; ask your health care provider or pharmacist if you have questions.  COMMON BRAND NAME(S): Xarelto  What should I tell my health care provider before I take this medicine?  They need to know if you have any of these conditions:  -bleeding disorders  -bleeding in the brain  -blood in your stools (black or tarry stools) or if you have blood in your vomit  -history of stomach bleeding  -kidney disease  -liver disease  -low blood counts, like low white cell, platelet, or red cell counts  -recent or planned spinal or epidural procedure  -take medicines that treat or prevent blood clots  -an unusual or allergic reaction to rivaroxaban, other medicines, foods, dyes, or preservatives  -pregnant or trying to get pregnant  -breast-feeding  How should I use this medicine?  Take this medicine by mouth with a glass of water. Follow the directions on the prescription label. Take your medicine at regular intervals. Do not take it more often than directed. Do not stop taking except on your doctor's advice.  If you are taking this medicine after hip or knee replacement surgery, take it with or without food.  If you are taking this medicine for atrial fibrillation, take it with your evening meal. If you are taking this medicine to treat blood clots, take it with food at the same time each day. If you are unable to swallow your tablet, you may crush the tablet and mix it in applesauce. Then, immediately eat the applesauce. You should eat more food right after you eat the applesauce containing the crushed tablet.  Talk to your pediatrician regarding the use of this medicine in children. Special care may be needed.  Overdosage: If you think you've taken too much of this medicine contact a poison control center or emergency room at once.  Overdosage:  If you think you have taken too much of this medicine contact a poison control center or emergency room at once.  NOTE: This medicine is only for you. Do not share this medicine with others.  What if I miss a dose?  If you take your medicine once a day and miss a dose, take the missed dose as soon as you remember. If you take your medicine twice a day and miss a dose, take the missed dose immediately. In this instance, 2 tablets may be taken at the same time. The next day you should take 1 tablet twice a day as directed.  What may interact with this medicine?  -aspirin and aspirin-like medicines  -certain antibiotics like erythromycin, azithromycin, and clarithromycin  -certain medicines for fungal infections like ketoconazole and itraconazole  -certain medicines for irregular heart beat like amiodarone, quinidine, dronedarone  -certain medicines for seizures like carbamazepine, phenytoin  -certain medicines that treat or prevent blood clots like warfarin, enoxaparin, and dalteparin   -conivaptan  -diltiazem  -felodipine  -indinavir  -lopinavir; ritonavir  -NSAIDS, medicines for pain and inflammation, like ibuprofen or naproxen  -ranolazine  -rifampin  -ritonavir  -Cassi's wort  -verapamil  This list may not describe all possible interactions. Give your health care provider a list of all the medicines, herbs, non-prescription drugs, or dietary supplements you use. Also tell them if you smoke, drink alcohol, or use illegal drugs. Some items may interact with your medicine.  What should I watch for while using this medicine?  Do not stop taking this medicine without first talking to your doctor. Stopping this medicine may increase your risk of having a stroke. Be sure to refill your prescription before you run out of medicine.  This medicine may increase your risk to bruise or bleed. Call your doctor or health care professional if you notice any unusual bleeding.  Be careful brushing and flossing your teeth or  using a toothpick because you may bleed more easily. If you have any dental work done, tell your dentist you are receiving this medicine.  What side effects may I notice from receiving this medicine?  Side effects that you should report to your doctor or health care professional as soon as possible:  -allergic reactions like skin rash, itching or hives, swelling of the face, lips, or tongue  -back pain  -bloody or black, tarry stools  -changes in vision  -confusion, trouble speaking or understanding  -red or dark-brown urine  -redness, blistering, peeling or loosening of the skin, including inside the mouth  -severe headaches  -spitting up blood or brown material that looks like coffee grounds  -sudden numbness or weakness of the face, arm or leg  -trouble walking, dizziness, loss of balance or coordination  -unusual bruising or bleeding from the eye, gums, or nose   Side effects that usually do not require medical attention (Report these to your doctor or health care professional if they continue or are bothersome.):  -dizziness  -muscle pain  This list may not describe all possible side effects. Call your doctor for medical advice about side effects. You may report side effects to FDA at 7-236-FDA-9786.  Where should I keep my medicine?  Keep out of the reach of children.  Store at room temperature between 15 and 30 degrees C (59 and 86 degrees F). Throw away any unused medicine after the expiration date.  NOTE: This sheet is a summary. It may not cover all possible information. If you have questions about this medicine, talk to your doctor, pharmacist, or health care provider.  © 2014, Elsevier[employer]. (3/17/2014 3:32:09 PM)    Rivaroxaban oral tablets  ¿Qué es belen medicamento?  El RIVAROXABÁN es un anticoagulante (diluyente de la nasreen). Se utiliza para tratar coágulos sanguíneos en los pulmones o venas. Se utiliza también después de tarik operación de rodilla o cadera para evitar coágulos sanguíneos. Se  utiliza también para reducir la posibilidad de derrame cerebral en los pacientes con tarik afección médica llamada fibrilación auricular.  Belen medicamento puede ser utilizado para otros usos; si tiene alguna pregunta consulte con cox proveedor de atención médica o con cox farmacéutico.  MARCAS COMERCIALES DISPONIBLES: Xarelto  ¿Qué le rose marie informar a mi profesional de la julius antes de deepak bleen medicamento?  Necesita saber si usted presenta alguno de los siguientes problemas o situaciones:  -trastornos de sangrado  -sangrado en el cerebro  -nasreen en las heces (heces de color oscuro o de aspecto alquitranado) o si vomita nasreen  -antecedentes de sangrado estomacal  -enfermedad renal  -enfermedad hepática  -conteos sanguíneos bajos, jaison baja cantidad de glóbulos blancos, glóbulos rojos y plaquetas  -procedimiento epidural o vertebral reciente o programado  -irina medicamentos que tratan o previenen coágulos sanguíneos   -tarik reacción alérgica o inusual al rivaroxabán, otros medicamentos, alimentos, colorantes o conservantes  -si está embarazada o buscando quedar embarazada  -si está amamantando a un bebé  ¿Cómo rose marie utilizar belen medicamento?  Grants belen medicamento por vía oral con un vaso de agua. Siga las instrucciones de la etiqueta del medicamento. Grants carlene dosis a intervalos regulares. No tome cox medicamento con tarik frecuencia mayor a la indicada. No deje de tomarlo excepto si así lo indica cox médico.  Si está tomando belen medicamento después de tarik cirugía de reemplazo de cadera o rodilla, tómelo con o sin alimentos.  Si está tomando belen medicamento para fibrilación auricular, tómelo con la justin (la última comida del día). Si está tomando belen medicamento para tratar coágulos sanguíneos, tómelo con alimentos a la misma hora cada día. Si no puede tragar la tableta, puede triturar la tableta y agregarla con puré de manzana. Luego, come el puré de manzana enseguida. Debe comer mas comida después de comer el puré  de manzana con la tableta triturada.  Hable con cox pediatra para informarse acerca del uso de belen medicamento en niños. Puede requerir atención especial.  Sobredosis: Póngase en contacto inmediatamente con un centro toxicológico o tarik bridger de urgencia si usted shree que haya tomado demasiado medicamento.  ATENCIÓN: Belen medicamento es solo para usted. No comparta belen medicamento con nadie.  ¿Qué sucede si me olvido de tarik dosis?  Si irina cox medicamento tarik vez al día y se olvida tarik dosis, tome la dosis olvidada tan pronto jaison se acuerde. Si irina cox medicamento dos veces al día y se olvida tarik dosis, tome la dosis olvidada inmediatamente. En belen tee, puede tomarse 2 tabletas al mismo tiempo. Al día siguiente debe deepak 1 tableta dos veces al día jaison indicado.  ¿Qué puede interactuar con belen medicamento?  -aspirina o medicamentos tipo aspirina  -ciertos antibióticos tales jaison eritromicina, azitromicina y claritromicina  -ciertos medicamentos para infecciones micóticas tales jaison quetoconazol e itraconazol  -ciertos medicamentos para pulso cardiaco irregular tales jaison amiodarona, quinidina, dronedarona  -ciertos medicamentos para convulsiones tales jaison carbamazepina, fenitoína  -conivaptán  -diltiazem  -felodipino  -indinavir  -lopinavir; ritonavir  -los CHARISSE, medicamentos para el dolor o inflamación, jaison ibuprofeno o naproxeno  -ranolazina  -rifampicina  -ritonavir  -hierba de Ben  -verapamilo  Puede ser que esta lista no menciona todas las posibles interacciones. Informe a cox profesional de la julius de todos los productos a base de hierbas, medicamentos de venta amber o suplementos nutritivos que esté tomando. Si usted fuma, consume bebidas alcohólicas o si utiliza drogas ilegales, indíqueselo también a cox profesional de la julius. Algunas sustancias pueden interactuar con cox medicamento.  ¿A qué rose marie estar atento al usar belen medicamento?  No deje de deepak belen medicamento sin consultar antes con cox  médico. Dejando de deepak belen medicamento puede aumentar cox riesgo de tener un derrame cerebral. Asegúrese de llenar cox receta antes de quedarse sin medicamento.  Belen medicamento puede aumentar el riesgo de magullar o sangrar. Si nota sangrado inusual, comuníquese con cox médico o cox profesional de la julius.  Proceda con cuidado al cepillar carlene dientes, al usar hilo dental o al utilizar palillos para los dientes ya que puede sangrar con mayor facilidad. Si va a someterse a tratamientos dentales, informe a cox dentista que está recibiendo belen medicamento.  ¿Qué efectos secundarios puedo tener al utilizar belen medicamento?  Efectos secundarios que debe informar a cox médico o a cox profesional de la julius tan pronto jaison sea posible:  -reacciones alérgicas jaison erupción cutánea, picazón o urticarias, hinchazón de la toy, labios o lengua  -heces de color oscuro, de aspecto alquitranado o con nasreen  -cambios en la visión   -confusión, dificultad para hablar o entender  -orina de color sexton o marrón oscuro  -enrojecimiento, formación de ampollas, descamación o distensión de la piel, inclusive dentro de la boca  -burton de anabela severos  -escupir nasreen o material marrón que parece granos de café  -entumecimiento o debilidad repentina de la toy, brazo o pierna  -dificultad para caminar, mareos, pérdida del equilibrio o coordinación  -sangrado o magulladuras inusuales del evans, encías o nariz   Efectos secundarios que, por lo general, no requieren atención médica (debe informarlos a cox médico o a cox profesional de la julius si persisten o si son molestos):  -mareos  -dolor muscular  Puede ser que esta lista no menciona todos los posibles efectos secundarios. Comuníquese a cox médico por asesoramiento médico sobre los efectos secundarios. Usted puede informar los efectos secundarios a la FDA por teléfono al [phone].  ¿Dónde rose marie guardar mi medicina?  Manténgala fuera del alcance de los niños.  Guárdela a temperatura  ambiente, entre 15 y 30 grados C (59 y 86 grados F). Deseche todo el medicamento que no haya utilizado, después de la fecha de vencimiento.  ATENCIÓN: Flory folleto es un resumen. Puede ser que no cubra toda la posible información. Si usted tiene preguntas acerca de esta medicina, consulte con cox médico, cox farmacéutico o cox profesional de la julius.  © 2014, [employer]. (3/8/2013 4:54:53 PM)

## 2018-01-23 NOTE — DISCHARGE SUMMARY
CHIEF COMPLAINT ON ADMISSION  No chief complaint on file.  7cm right renal mass    CODE STATUS  Full Code    HPI & HOSPITAL COURSE  This is a 47 y.o. female here with a 7cm right renal mass.  She underwent a robotic-assisted laparoscopic right radical nephrectomy on 1/18/18.    She is now POD 5.  She is doing well.  Tolerating diet, ambulatory and good pain control.    Therefore, she is discharged in good and stable condition with close outpatient follow-up.    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 *      FOLLOW UP  No future appointments.  Isaac Hicks M.D.  [id number] [place] [id number]  [phone]    Schedule an appointment as soon as possible for a visit in 6 days  Monday, For suture removal      MEDICATIONS ON DISCHARGE   Eva Chase   Home Medication Instructions RANDALL:[id number]    Printed on:01/23/18 1026   Medication Information                      amlodipine (NORVASC) 5 MG Tab  Take 5 mg by mouth every evening.             chlorthalidone (HYGROTON) 50 MG Tab  Take 50 mg by mouth every evening.             ferrous sulfate 325 (65 Fe) MG EC tablet  Take 1 Tab by mouth 3 times a day, with meals.             hydrocodone-acetaminophen (NORCO) 5-325 MG Tab per tablet  Take 2 Tabs by mouth every 6 hours as needed for up to 3 days.             rivaroxaban (XARELTO) 10 MG Tab tablet  Take 1 Tab by mouth every day at 6 PM.                 DIET  Orders Placed This Encounter   Procedures   • DIET ORDER     Standing Status:   Standing     Number of Occurrences:   1     Order Specific Question:   Diet:     Answer:   Regular [1]   • DISCONTINUE DIET TRAY     Standing Status:   Standing     Number of Occurrences:   1       ACTIVITY  Walk as  tolerated        CONSULTATIONS  none    PROCEDURES  Robotic-assisted laparoscopic right radical nephrectomy,   converted to open nephrectomy, complicated.    LABORATORY  Lab Results   Component Value Date/Time    SODIUM 134 (L) 01/22/2018 04:25 AM    POTASSIUM 3.8 01/22/2018 04:25 AM    CHLORIDE 103 01/22/2018 04:25 AM    CO2 26 01/22/2018 04:25 AM    GLUCOSE 170 (H) 01/22/2018 04:25 AM    BUN 8 01/22/2018 04:25 AM    CREATININE 0.96 01/22/2018 04:25 AM    CREATININE 0.8 09/30/2008 09:58 AM        Lab Results   Component Value Date/Time    WBC 7.2 01/22/2018 04:25 AM    HEMOGLOBIN 9.0 (L) 01/22/2018 04:25 AM    HEMATOCRIT 28.8 (L) 01/22/2018 04:25 AM    PLATELETCT 289 01/22/2018 04:25 AM        Total time of the discharge process exceeds 32 minutes

## 2018-01-23 NOTE — PROGRESS NOTES
Patient alert and oriented x 4. Incision site to right flank with staples. 3 lap sites to abdomen with staples. No drainage noted for surgical sites. Patient complained of pain. Medicated for pain. Ambulated to bathroom with 1 assist. Bed in lowest position and call light within reach.

## 2018-01-23 NOTE — PROGRESS NOTES
Doing fine, tolerating diet, ambulatory.    Plan is home later with xarelto daily x 3 months.    Staples out in office this monday.

## 2018-01-29 NOTE — DOCUMENTATION QUERY
DOCUMENTATION QUERY    PROVIDERS: Please select “Cosign w/ note”to reply to query.    To better represent the severity of illness of your patient, please review the following information and exercise your independent professional judgment in responding to this query.     Clear cell renal carcinoma is documented in the Pathology Reports. Per coding guidelines coders cannot code diagnosis from the pathology report without the Attending Physicians documentation of the diagnosis. Based upon the clinical findings, risk factors, and treatment, can this diagnosis be further specified?    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

## 2018-02-06 ENCOUNTER — HOSPITAL ENCOUNTER (OUTPATIENT)
Facility: MEDICAL CENTER | Age: 48
End: 2018-02-06
Attending: INTERNAL MEDICINE
Payer: COMMERCIAL

## 2018-02-06 PROCEDURE — 83615 LACTATE (LD) (LDH) ENZYME: CPT

## 2018-02-06 PROCEDURE — 80053 COMPREHEN METABOLIC PANEL: CPT

## 2018-02-07 LAB
ALBUMIN SERPL BCP-MCNC: 3.6 G/DL (ref 3.2–4.9)
ALBUMIN/GLOB SERPL: 1 G/DL
ALP SERPL-CCNC: 116 U/L (ref 30–99)
ALT SERPL-CCNC: 33 U/L (ref 2–50)
ANION GAP SERPL CALC-SCNC: 11 MMOL/L (ref 0–11.9)
AST SERPL-CCNC: 27 U/L (ref 12–45)
BILIRUB SERPL-MCNC: 0.3 MG/DL (ref 0.1–1.5)
BUN SERPL-MCNC: 19 MG/DL (ref 8–22)
CALCIUM SERPL-MCNC: 8.6 MG/DL (ref 8.5–10.5)
CHLORIDE SERPL-SCNC: 103 MMOL/L (ref 96–112)
CO2 SERPL-SCNC: 24 MMOL/L (ref 20–33)
CREAT SERPL-MCNC: 1.2 MG/DL (ref 0.5–1.4)
GLOBULIN SER CALC-MCNC: 3.6 G/DL (ref 1.9–3.5)
GLUCOSE SERPL-MCNC: 180 MG/DL (ref 65–99)
LDH SERPL L TO P-CCNC: 156 U/L (ref 107–266)
POTASSIUM SERPL-SCNC: 3.6 MMOL/L (ref 3.6–5.5)
PROT SERPL-MCNC: 7.2 G/DL (ref 6–8.2)
SODIUM SERPL-SCNC: 138 MMOL/L (ref 135–145)

## 2018-03-02 ENCOUNTER — HOSPITAL ENCOUNTER (OUTPATIENT)
Dept: RADIOLOGY | Facility: MEDICAL CENTER | Age: 48
End: 2018-03-02
Attending: INTERNAL MEDICINE
Payer: COMMERCIAL

## 2018-03-02 DIAGNOSIS — C64.9 RENAL CELL CARCINOMA, UNSPECIFIED LATERALITY (HCC): ICD-10-CM

## 2018-03-02 PROCEDURE — A9503 TC99M MEDRONATE: HCPCS

## 2018-03-02 PROCEDURE — 71250 CT THORAX DX C-: CPT

## 2018-03-07 ENCOUNTER — APPOINTMENT (OUTPATIENT)
Dept: ADMISSIONS | Facility: MEDICAL CENTER | Age: 48
End: 2018-03-07
Attending: INTERNAL MEDICINE
Payer: COMMERCIAL

## 2018-03-07 DIAGNOSIS — Z01.812 PRE-PROCEDURAL LABORATORY EXAMINATION: ICD-10-CM

## 2018-03-07 LAB
INR PPP: 1.07 (ref 0.87–1.13)
PLATELET # BLD AUTO: 368 K/UL (ref 164–446)
PROTHROMBIN TIME: 13.6 SEC (ref 12–14.6)

## 2018-03-07 PROCEDURE — 36415 COLL VENOUS BLD VENIPUNCTURE: CPT

## 2018-03-07 PROCEDURE — 85610 PROTHROMBIN TIME: CPT

## 2018-03-07 PROCEDURE — 85049 AUTOMATED PLATELET COUNT: CPT

## 2018-03-08 ENCOUNTER — HOSPITAL ENCOUNTER (OUTPATIENT)
Facility: MEDICAL CENTER | Age: 48
End: 2018-03-08
Attending: INTERNAL MEDICINE | Admitting: INTERNAL MEDICINE
Payer: COMMERCIAL

## 2018-03-08 ENCOUNTER — APPOINTMENT (OUTPATIENT)
Dept: RADIOLOGY | Facility: MEDICAL CENTER | Age: 48
End: 2018-03-08
Attending: RADIOLOGY
Payer: COMMERCIAL

## 2018-03-08 ENCOUNTER — APPOINTMENT (OUTPATIENT)
Dept: RADIOLOGY | Facility: MEDICAL CENTER | Age: 48
End: 2018-03-08
Attending: INTERNAL MEDICINE
Payer: COMMERCIAL

## 2018-03-08 VITALS
TEMPERATURE: 97.8 F | BODY MASS INDEX: 41.29 KG/M2 | SYSTOLIC BLOOD PRESSURE: 113 MMHG | RESPIRATION RATE: 16 BRPM | DIASTOLIC BLOOD PRESSURE: 63 MMHG | WEIGHT: 233 LBS | HEIGHT: 63 IN | OXYGEN SATURATION: 99 % | HEART RATE: 80 BPM

## 2018-03-08 DIAGNOSIS — C64.1 MALIGNANT NEOPLASM OF RIGHT KIDNEY, EXCEPT RENAL PELVIS (HCC): ICD-10-CM

## 2018-03-08 LAB — HCG SERPL QL: NEGATIVE

## 2018-03-08 PROCEDURE — 88305 TISSUE EXAM BY PATHOLOGIST: CPT

## 2018-03-08 PROCEDURE — 88341 IMHCHEM/IMCYTCHM EA ADD ANTB: CPT | Mod: 91

## 2018-03-08 PROCEDURE — 71045 X-RAY EXAM CHEST 1 VIEW: CPT

## 2018-03-08 PROCEDURE — 99153 MOD SED SAME PHYS/QHP EA: CPT

## 2018-03-08 PROCEDURE — 160002 HCHG RECOVERY MINUTES (STAT)

## 2018-03-08 PROCEDURE — 88342 IMHCHEM/IMCYTCHM 1ST ANTB: CPT

## 2018-03-08 PROCEDURE — 700111 HCHG RX REV CODE 636 W/ 250 OVERRIDE (IP)

## 2018-03-08 PROCEDURE — 84703 CHORIONIC GONADOTROPIN ASSAY: CPT

## 2018-03-08 RX ORDER — SODIUM CHLORIDE 9 MG/ML
500 INJECTION, SOLUTION INTRAVENOUS
Status: ACTIVE | OUTPATIENT
Start: 2018-03-08 | End: 2018-03-08

## 2018-03-08 RX ORDER — SODIUM CHLORIDE 9 MG/ML
INJECTION, SOLUTION INTRAVENOUS
Status: DISCONTINUED | OUTPATIENT
Start: 2018-03-08 | End: 2018-03-08 | Stop reason: HOSPADM

## 2018-03-08 RX ORDER — ONDANSETRON 2 MG/ML
4 INJECTION INTRAMUSCULAR; INTRAVENOUS PRN
Status: ACTIVE | OUTPATIENT
Start: 2018-03-08 | End: 2018-03-08

## 2018-03-08 RX ORDER — NALOXONE HYDROCHLORIDE 0.4 MG/ML
INJECTION, SOLUTION INTRAMUSCULAR; INTRAVENOUS; SUBCUTANEOUS
Status: COMPLETED
Start: 2018-03-08 | End: 2018-03-08

## 2018-03-08 RX ORDER — ONDANSETRON 2 MG/ML
4 INJECTION INTRAMUSCULAR; INTRAVENOUS EVERY 8 HOURS PRN
Status: DISCONTINUED | OUTPATIENT
Start: 2018-03-08 | End: 2018-03-08 | Stop reason: HOSPADM

## 2018-03-08 RX ORDER — HYDROMORPHONE HYDROCHLORIDE 2 MG/ML
0.25 INJECTION, SOLUTION INTRAMUSCULAR; INTRAVENOUS; SUBCUTANEOUS
Status: DISCONTINUED | OUTPATIENT
Start: 2018-03-08 | End: 2018-03-08 | Stop reason: HOSPADM

## 2018-03-08 RX ORDER — OXYCODONE HYDROCHLORIDE 5 MG/1
2.5 TABLET ORAL
Status: DISCONTINUED | OUTPATIENT
Start: 2018-03-08 | End: 2018-03-08 | Stop reason: HOSPADM

## 2018-03-08 RX ORDER — MIDAZOLAM HYDROCHLORIDE 1 MG/ML
.5-2 INJECTION INTRAMUSCULAR; INTRAVENOUS PRN
Status: ACTIVE | OUTPATIENT
Start: 2018-03-08 | End: 2018-03-08

## 2018-03-08 RX ORDER — MIDAZOLAM HYDROCHLORIDE 1 MG/ML
INJECTION INTRAMUSCULAR; INTRAVENOUS
Status: COMPLETED
Start: 2018-03-08 | End: 2018-03-08

## 2018-03-08 RX ADMIN — FENTANYL CITRATE 50 MCG: 50 INJECTION, SOLUTION INTRAMUSCULAR; INTRAVENOUS at 08:51

## 2018-03-08 RX ADMIN — MIDAZOLAM 1 MG: 1 INJECTION INTRAMUSCULAR; INTRAVENOUS at 08:51

## 2018-03-08 RX ADMIN — SODIUM CHLORIDE: 9 INJECTION, SOLUTION INTRAVENOUS at 07:27

## 2018-03-08 RX ADMIN — MIDAZOLAM HYDROCHLORIDE 1 MG: 1 INJECTION INTRAMUSCULAR; INTRAVENOUS at 08:53

## 2018-03-08 RX ADMIN — MIDAZOLAM HYDROCHLORIDE 1 MG: 1 INJECTION INTRAMUSCULAR; INTRAVENOUS at 08:51

## 2018-03-08 ASSESSMENT — PAIN SCALES - GENERAL
PAINLEVEL_OUTOF10: 0

## 2018-03-08 NOTE — OR SURGEON
Immediate Post- Operative Note    PostOp Diagnosis: LEFT LUNG PLEURAL BASED OR EXTRAPLEURAL MASS, HX RENAL CELL CA      Procedure(s): CT GUIDED LEFT LUNG BIOPSY      Estimated Blood Loss: <5CC        Complications: NONE          3/8/2018     9:09 AM     Kevin Bullard

## 2018-03-08 NOTE — DISCHARGE INSTRUCTIONS
ACTIVITY: Rest and take it easy for the first 24 hours.  A responsible adult is recommended to remain with you during that time.  It is normal to feel sleepy.  We encourage you to not do anything that requires balance, judgment or coordination.    MILD FLU-LIKE SYMPTOMS ARE NORMAL. YOU MAY EXPERIENCE GENERALIZED MUSCLE ACHES, THROAT IRRITATION, HEADACHE AND/OR SOME NAUSEA.    FOR 24 HOURS DO NOT:  Drive, operate machinery or run household appliances.  Drink beer or alcoholic beverages.   Make important decisions or sign legal documents.    SPECIAL INSTRUCTIONS: *KEEP INCISION DRY FOR 24 HRS**    DIET: To avoid nausea, slowly advance diet as tolerated, avoiding spicy or greasy foods for the first day.  Add more substantial food to your diet according to your physician's instructions.  Babies can be fed formula or breast milk as soon as they are hungry.  INCREASE FLUIDS AND FIBER TO AVOID CONSTIPATION.    SURGICAL DRESSING/BATHING: *MAY SHOWER TOMORROW AFTERNOON THEN MAY REMOVE BAND-AID**    FOLLOW-UP APPOINTMENT:  A follow-up appointment should be arranged with your doctor in *[facility]   [phone]  **; call to schedule.    You should CALL YOUR PHYSICIAN if you develop:  Fever greater than 101 degrees F.  Pain not relieved by medication, or persistent nausea or vomiting.  Excessive bleeding (blood soaking through dressing) or unexpected drainage from the wound.  Extreme redness or swelling around the incision site, drainage of pus or foul smelling drainage.  Inability to urinate or empty your bladder within 8 hours.  Problems with breathing or chest pain.    You should call 911 if you develop problems with breathing or chest pain.  If you are unable to contact your doctor or surgical center, you should go to the nearest emergency room or urgent care center.  Physician's telephone #: *[phone]**    If any questions arise, call your doctor.  If your doctor is not available, please feel free to call the Surgical  Center at [phone].  The Center is open Monday through Friday from 7AM to 7PM.  You can also call the HEALTH HOTLINE open 24 hours/day, 7 days/week and speak to a nurse at [phone], or toll free at [phone].    A registered nurse may call you a few days after your surgery to see how you are doing after your procedure.    MEDICATIONS: Resume taking daily medication.  Take prescribed pain medication with food.  If no medication is prescribed, you may take non-aspirin pain medication if needed.  PAIN MEDICATION CAN BE VERY CONSTIPATING.  Take a stool softener or laxative such as senokot, pericolace, or milk of magnesia if needed.      If your physician has prescribed pain medication that includes Acetaminophen (Tylenol), do not take additional Acetaminophen (Tylenol) while taking the prescribed medication.    Depression / Suicide Risk    As you are discharged from this [facility] facility, it is important to learn how to keep safe from harming yourself.    Recognize the warning signs:  · Abrupt changes in personality, positive or negative- including increase in energy   · Giving away possessions  · Change in eating patterns- significant weight changes-  positive or negative  · Change in sleeping patterns- unable to sleep or sleeping all the time   · Unwillingness or inability to communicate  · Depression  · Unusual sadness, discouragement and loneliness  · Talk of wanting to die  · Neglect of personal appearance   · Rebelliousness- reckless behavior  · Withdrawal from people/activities they love  · Confusion- inability to concentrate     If you or a loved one observes any of these behaviors or has concerns about self-harm, here's what you can do:  · Talk about it- your feelings and reasons for harming yourself  · Remove any means that you might use to hurt yourself (examples: pills, rope, extension cords, firearm)  · Get professional help from the community (Mental Health, Substance Abuse,  psychological counseling)  · Do not be alone:Call your Safe Contact- someone whom you trust who will be there for you.  · Call your local CRISIS HOTLINE 639-6127 or 892-784-7679  · Call your local Children's Mobile Crisis Response Team [place] (275) 608-6437 or www.[facility]  · Call the toll free National Suicide Prevention Hotlines   · National Suicide Prevention Lifeline 945-747-JLHF (9210)  · National Hope Line Network 800-SUICIDE (846-0787)

## 2018-03-08 NOTE — OR NURSING
0056    PATIENT RECEIVED FROM IR,  S/P LEFT LUNG BX.  BX SITE WITH BAND-AID INTACT.  DENIES ANY PAIN.   @ BEDSIDE.      1000   PATIENT RESTING COMFORTABLY.    1030   1ST PCXR DONE PER POST LUNG BX.    1100   1ST CXR IS BACK NEGATIVE OF ANY PNEUMOTHORAX.  PATIENT GIVEN WATER AND SNACK.  BX SITE IS CLEAR.    1200   PATIENT UP AND AMBULATE TO BATHROOM TO VOID.    1230   2ND PCXR DONE.     1250   CXR RESULT IS BACK NEGATIVE OF ANY PNEUMOTHORAX.  DC INSTRUCTIONS GIVEN TO PATIENT AND FAMILY.  VERBALIZED UNDERSTANDING OF ALL.  HL DC.  PATIENT DC TO HOME,  AMBULATORY, ESCORTED OUT BY FAMILY,  REFUSED STAFF ESCORT.

## 2018-03-20 ENCOUNTER — HOSPITAL ENCOUNTER (OUTPATIENT)
Dept: LAB | Facility: MEDICAL CENTER | Age: 48
End: 2018-03-20
Attending: INTERNAL MEDICINE
Payer: COMMERCIAL

## 2018-03-20 LAB
ANION GAP SERPL CALC-SCNC: 11 MMOL/L (ref 0–11.9)
BUN SERPL-MCNC: 20 MG/DL (ref 8–22)
CALCIUM SERPL-MCNC: 9.4 MG/DL (ref 8.5–10.5)
CHLORIDE SERPL-SCNC: 99 MMOL/L (ref 96–112)
CO2 SERPL-SCNC: 27 MMOL/L (ref 20–33)
CREAT SERPL-MCNC: 1.53 MG/DL (ref 0.5–1.4)
FERRITIN SERPL-MCNC: 23.5 NG/ML (ref 10–291)
GLUCOSE SERPL-MCNC: 173 MG/DL (ref 65–99)
IRON SATN MFR SERPL: 6 % (ref 15–55)
IRON SERPL-MCNC: 25 UG/DL (ref 40–170)
POTASSIUM SERPL-SCNC: 3.1 MMOL/L (ref 3.6–5.5)
SODIUM SERPL-SCNC: 137 MMOL/L (ref 135–145)
TIBC SERPL-MCNC: 448 UG/DL (ref 250–450)

## 2018-03-20 PROCEDURE — 80048 BASIC METABOLIC PNL TOTAL CA: CPT

## 2018-03-20 PROCEDURE — 83550 IRON BINDING TEST: CPT

## 2018-03-20 PROCEDURE — 83540 ASSAY OF IRON: CPT

## 2018-03-20 PROCEDURE — 82728 ASSAY OF FERRITIN: CPT

## 2018-04-04 ENCOUNTER — APPOINTMENT (OUTPATIENT)
Dept: RADIOLOGY | Facility: MEDICAL CENTER | Age: 48
End: 2018-04-04
Attending: EMERGENCY MEDICINE
Payer: COMMERCIAL

## 2018-04-04 ENCOUNTER — HOSPITAL ENCOUNTER (EMERGENCY)
Facility: MEDICAL CENTER | Age: 48
End: 2018-04-04
Attending: EMERGENCY MEDICINE
Payer: COMMERCIAL

## 2018-04-04 VITALS
SYSTOLIC BLOOD PRESSURE: 126 MMHG | BODY MASS INDEX: 40.4 KG/M2 | DIASTOLIC BLOOD PRESSURE: 82 MMHG | TEMPERATURE: 96.8 F | HEART RATE: 74 BPM | RESPIRATION RATE: 18 BRPM | HEIGHT: 63 IN | WEIGHT: 228 LBS | OXYGEN SATURATION: 97 %

## 2018-04-04 DIAGNOSIS — D64.9 CHRONIC ANEMIA: ICD-10-CM

## 2018-04-04 DIAGNOSIS — R53.1 GENERALIZED WEAKNESS: ICD-10-CM

## 2018-04-04 DIAGNOSIS — R17 YELLOW SKIN: ICD-10-CM

## 2018-04-04 DIAGNOSIS — N93.9 VAGINAL BLEEDING: ICD-10-CM

## 2018-04-04 LAB
ABO GROUP BLD: NORMAL
ABO GROUP BLD: NORMAL
ALBUMIN SERPL BCP-MCNC: 3.1 G/DL (ref 3.2–4.9)
ALBUMIN/GLOB SERPL: 0.8 G/DL
ALP SERPL-CCNC: 135 U/L (ref 30–99)
ALT SERPL-CCNC: 25 U/L (ref 2–50)
ANION GAP SERPL CALC-SCNC: 9 MMOL/L (ref 0–11.9)
APPEARANCE UR: CLEAR
APTT PPP: 50.1 SEC (ref 24.7–36)
AST SERPL-CCNC: 39 U/L (ref 12–45)
BACTERIA #/AREA URNS HPF: NEGATIVE /HPF
BASOPHILS # BLD AUTO: 0.2 % (ref 0–1.8)
BASOPHILS # BLD: 0.01 K/UL (ref 0–0.12)
BILIRUB CONJ SERPL-MCNC: <0.1 MG/DL (ref 0.1–0.5)
BILIRUB INDIRECT SERPL-MCNC: NORMAL MG/DL (ref 0–1)
BILIRUB SERPL-MCNC: 0.4 MG/DL (ref 0.1–1.5)
BILIRUB UR QL STRIP.AUTO: ABNORMAL
BLD GP AB SCN SERPL QL: NORMAL
BUN SERPL-MCNC: 22 MG/DL (ref 8–22)
CALCIUM SERPL-MCNC: 8.2 MG/DL (ref 8.5–10.5)
CHLORIDE SERPL-SCNC: 96 MMOL/L (ref 96–112)
CO2 SERPL-SCNC: 25 MMOL/L (ref 20–33)
COLOR UR: ABNORMAL
CREAT SERPL-MCNC: 2.03 MG/DL (ref 0.5–1.4)
EKG IMPRESSION: NORMAL
EOSINOPHIL # BLD AUTO: 0.11 K/UL (ref 0–0.51)
EOSINOPHIL NFR BLD: 2.6 % (ref 0–6.9)
EPI CELLS #/AREA URNS HPF: ABNORMAL /HPF
ERYTHROCYTE [DISTWIDTH] IN BLOOD BY AUTOMATED COUNT: 39.4 FL (ref 35.9–50)
GLOBULIN SER CALC-MCNC: 3.9 G/DL (ref 1.9–3.5)
GLUCOSE SERPL-MCNC: 210 MG/DL (ref 65–99)
GLUCOSE UR STRIP.AUTO-MCNC: NEGATIVE MG/DL
HCG SERPL QL: NEGATIVE
HCT VFR BLD AUTO: 26.5 % (ref 37–47)
HGB BLD-MCNC: 8.9 G/DL (ref 12–16)
HYALINE CASTS #/AREA URNS LPF: ABNORMAL /LPF
IMM GRANULOCYTES # BLD AUTO: 0.05 K/UL (ref 0–0.11)
IMM GRANULOCYTES NFR BLD AUTO: 1.2 % (ref 0–0.9)
INR PPP: 1.58 (ref 0.87–1.13)
KETONES UR STRIP.AUTO-MCNC: ABNORMAL MG/DL
LEUKOCYTE ESTERASE UR QL STRIP.AUTO: NEGATIVE
LYMPHOCYTES # BLD AUTO: 1.11 K/UL (ref 1–4.8)
LYMPHOCYTES NFR BLD: 26.5 % (ref 22–41)
MCH RBC QN AUTO: 27 PG (ref 27–33)
MCHC RBC AUTO-ENTMCNC: 33.6 G/DL (ref 33.6–35)
MCV RBC AUTO: 80.3 FL (ref 81.4–97.8)
MICRO URNS: ABNORMAL
MONOCYTES # BLD AUTO: 0.17 K/UL (ref 0–0.85)
MONOCYTES NFR BLD AUTO: 4.1 % (ref 0–13.4)
NEUTROPHILS # BLD AUTO: 2.74 K/UL (ref 2–7.15)
NEUTROPHILS NFR BLD: 65.4 % (ref 44–72)
NITRITE UR QL STRIP.AUTO: NEGATIVE
NRBC # BLD AUTO: 0 K/UL
NRBC BLD-RTO: 0 /100 WBC
PH UR STRIP.AUTO: 5 [PH]
PLATELET # BLD AUTO: 154 K/UL (ref 164–446)
PMV BLD AUTO: 9.4 FL (ref 9–12.9)
POTASSIUM SERPL-SCNC: 2.9 MMOL/L (ref 3.6–5.5)
PROT SERPL-MCNC: 7 G/DL (ref 6–8.2)
PROT UR QL STRIP: NEGATIVE MG/DL
PROTHROMBIN TIME: 18.5 SEC (ref 12–14.6)
RBC # BLD AUTO: 3.3 M/UL (ref 4.2–5.4)
RBC # URNS HPF: >150 /HPF
RBC UR QL AUTO: ABNORMAL
RENAL EPI CELLS #/AREA URNS HPF: NEGATIVE /HPF
RH BLD: NORMAL
RH BLD: NORMAL
SODIUM SERPL-SCNC: 130 MMOL/L (ref 135–145)
SP GR UR STRIP.AUTO: 1.02
UROBILINOGEN UR STRIP.AUTO-MCNC: 1 MG/DL
WBC # BLD AUTO: 4.2 K/UL (ref 4.8–10.8)
WBC #/AREA URNS HPF: ABNORMAL /HPF

## 2018-04-04 PROCEDURE — 93005 ELECTROCARDIOGRAM TRACING: CPT | Performed by: EMERGENCY MEDICINE

## 2018-04-04 PROCEDURE — 82248 BILIRUBIN DIRECT: CPT

## 2018-04-04 PROCEDURE — 76830 TRANSVAGINAL US NON-OB: CPT

## 2018-04-04 PROCEDURE — 85730 THROMBOPLASTIN TIME PARTIAL: CPT

## 2018-04-04 PROCEDURE — 700102 HCHG RX REV CODE 250 W/ 637 OVERRIDE(OP): Performed by: EMERGENCY MEDICINE

## 2018-04-04 PROCEDURE — 80053 COMPREHEN METABOLIC PANEL: CPT

## 2018-04-04 PROCEDURE — 86901 BLOOD TYPING SEROLOGIC RH(D): CPT

## 2018-04-04 PROCEDURE — 86850 RBC ANTIBODY SCREEN: CPT

## 2018-04-04 PROCEDURE — 81001 URINALYSIS AUTO W/SCOPE: CPT

## 2018-04-04 PROCEDURE — 86900 BLOOD TYPING SEROLOGIC ABO: CPT

## 2018-04-04 PROCEDURE — 85610 PROTHROMBIN TIME: CPT

## 2018-04-04 PROCEDURE — 99284 EMERGENCY DEPT VISIT MOD MDM: CPT

## 2018-04-04 PROCEDURE — 85025 COMPLETE CBC W/AUTO DIFF WBC: CPT

## 2018-04-04 PROCEDURE — 93005 ELECTROCARDIOGRAM TRACING: CPT

## 2018-04-04 PROCEDURE — 84703 CHORIONIC GONADOTROPIN ASSAY: CPT

## 2018-04-04 PROCEDURE — 36415 COLL VENOUS BLD VENIPUNCTURE: CPT

## 2018-04-04 PROCEDURE — 700105 HCHG RX REV CODE 258: Performed by: EMERGENCY MEDICINE

## 2018-04-04 PROCEDURE — A9270 NON-COVERED ITEM OR SERVICE: HCPCS | Performed by: EMERGENCY MEDICINE

## 2018-04-04 RX ORDER — POTASSIUM CHLORIDE 20 MEQ/1
40 TABLET, EXTENDED RELEASE ORAL ONCE
Status: COMPLETED | OUTPATIENT
Start: 2018-04-04 | End: 2018-04-04

## 2018-04-04 RX ORDER — SUNITINIB MALATE 50 MG/1
CAPSULE ORAL DAILY
COMMUNITY
End: 2018-04-12

## 2018-04-04 RX ORDER — FERROUS GLUCONATE 324(38)MG
324 TABLET ORAL
COMMUNITY
End: 2018-04-12

## 2018-04-04 RX ORDER — SODIUM CHLORIDE 9 MG/ML
1000 INJECTION, SOLUTION INTRAVENOUS ONCE
Status: COMPLETED | OUTPATIENT
Start: 2018-04-04 | End: 2018-04-04

## 2018-04-04 RX ADMIN — SODIUM CHLORIDE 1000 ML: 9 INJECTION, SOLUTION INTRAVENOUS at 05:44

## 2018-04-04 RX ADMIN — POTASSIUM CHLORIDE 40 MEQ: 1500 TABLET, EXTENDED RELEASE ORAL at 05:08

## 2018-04-04 ASSESSMENT — PAIN SCALES - GENERAL: PAINLEVEL_OUTOF10: 0

## 2018-04-04 NOTE — ED NOTES
D/C home with written and verbal instructions re: Rx, activity, f/u.  Verbalizes understanding.  Patient wheeled out to lobby with .

## 2018-04-04 NOTE — ED TRIAGE NOTES
Eva Castellano  47 y.o.  Chief Complaint   Patient presents with   • Vaginal Bleeding     worsening x 2 weeks, passing clots   • Weakness     worsening with extended vaginal bleeding     Patient to triage via wheelchair for above. Patient pale and withdrawn.    Patient is currently being treated for metastatic renal cell carcinoma. Oncologist: Dr. Fuentes.    Home medications:   Xarelto 10 mg PO daily   Sutent 50 mg PO daily   Amlodipine 5 mg PO daily   Chlorthalidone 50 mg PO daily    Patient states that she has recently been told by her doctor that she was anemic - states that he told her to supplement with PO Iron at home that she has been taking twice a day at home.    Patient is Portuguese only. Family present in triage with patient.    Charge RN Shelli notified of patient.    Patient roomed immediately to [facility] via wheelchair.

## 2018-04-04 NOTE — DISCHARGE INSTRUCTIONS
Fatiga  (Fatigue)  La fatiga es tarik sensación de cansancio en todo momento, falta de energía o falta de motivación. La fatiga ocasional o leve con frecuencia es tarik reacción normal a la actividad o la trinidad en general. Sin embargo, la fatiga de larga duración (crónica) o extrema puede indicar tarik enfermedad preexistente.  INSTRUCCIONES PARA EL CUIDADO EN EL HOGAR  Controle cox fatiga para jose si hay cambios. Las siguientes indicaciones ayudarán a aliviar cualquier molestia que pueda sentir:  · Hable con el médico acerca de cuánto debe dormir cada noche. Trate de dormir la cantidad de tiempo requerida todas las noches.  · [place] los medicamentos solamente jaison se lo haya indicado el médico.  · Siga tarik dieta saludable y nutritiva. Pida ayuda al médico si necesita hacer cambios en cox dieta.  · Danuta suficiente líquido para mantener la orina laurent o de color amarillo pálido.  · Practique actividades que lo relajen, jaison yoga, meditación, terapia de masajes o acupuntura.  · Justin ejercicios regularmente.  · Cambie las situaciones que le provocan estrés. Trate de que cox rutina de trabajo y personal sea moderada.  · No consuma drogas.  · Limite el consumo de alcohol a no más de 1 medida por día si es shirley y no está embarazada, y 2 medidas si es hombre. Tarik medida equivale a 12 onzas de cerveza, 5 onzas de vino o 1½ onzas de bebidas alcohólicas de magdaleno graduación.  · [place] tarik multivitamina, si se lo indicó el médico.  SOLICITE ATENCIÓN MÉDICA SI:  · La fatiga no mejora.  · Tiene fiebre.  · Tiene pérdida o aumento involuntario de peso.  · Tiene burton de anabela.  · Tiene dificultad para:  ¨ Dormirse.  ¨ Dormir hans toda la noche.  · Se siente enojado, culpable, ansioso o ralph.  · No puede defecar (estreñimiento).  · Tiene la piel seca.  · Tiene hinchadas las piernas u otra parte del cuerpo.  SOLICITE ATENCIÓN MÉDICA DE INMEDIATO SI:  · Se siente confundido.  · Tiene visión borrosa.  · Sufre mareos o se desmaya.  · Sufre un  dolor intenso de anabela.  · Siente dolor intenso en el abdomen, la pelvis o la espalda.  · Tiene dolor de pecho, dificultad para respirar, o latidos cardíacos irregulares o acelerados.  · No puede orinar u orina menos de lo normal.  · Presenta sangrado anormal, jaison sangrado del recto, la vagina, la nariz, los pulmones o los pezones.  · Vomita nasreen.  · Tiene pensamientos acerca de hacerse daño a sí mismo o cometer suicidio.  · Le preocupa la posibilidad de hacerle daño a otra persona.  Esta información no tiene jaison fin reemplazar el consejo del médico. Asegúrese de hacerle al médico cualquier pregunta que tenga.  Document Released: 04/05/2010 Document Revised: 04/10/2017 Document Reviewed: 04/21/2015  The [employer] Patient Education © 2017 [employer]        Menorragia  (Menorrhagia)  Se llama menorragia a los periodos menstruales abundantes o que rae más de lo habitual.  CUIDADOS EN EL HOGAR  · Sólo tome los medicamentos que le haya indicado el médico.  · [place] los comprimidos de lila según las indicaciones del médico. El sangrado abundante puede causar bajos niveles de lila en el organismo.  · Notome aspirina desde 1 semana antes ni hans el período menstrual. La aspirina puede hacer que la hemorragia empeore.  · Recuéstese un rato si debe cambiar el tampón o el apósito más de tarik vez en 2 horas. [place] puede ayudar a disminuir el sangrado.  · [place] tarik dieta saludable y alimentos ricos en lila. Entre estos alimentos se incluyen vegetales de hoja stacy, carne, hígado, huevos y panes y cereales de grano entero.  · No trate de perder peso. Espere hasta que la hemorragia se haya detenido y carlene niveles de lila vuelvan a ser normales.  SOLICITE AYUDA SI:  · Empapa un tampón o un apósito cada 1 o 2 horas, y esto le ocurre cada vez que tiene el período.  · Necesita usar apósitos y tampones al mismo tiempo porque pierde demasiada nasreen.  · Debe cambiarse el apósito o el tampón hans la  noche.  · Tiene un período que dura más de 8 días.  · Elimina coágulos más grandes que 1 pulgada (2,5 cm).  · Tiene períodos irregulares que ocurren más o menos de tarik vez al mes.  · Se siente mareada o se desvanece (se desmaya).  · Se siente muy débil o cansada.  · Le falta el aire o siente que el corazón late muy rápido al hacer ejercicios.  · Tiene ganas de vomitar ( náuseas ) y devuelve ( vomita ) mientras está tomando el medicamento.  · Tiene heces acuosas (diarrea) mientras irina los medicamentos.  · Tiene algún problema que pueda relacionarse con el medicamento que está tomando.  SOLICITE AYUDA DE INMEDIATO SI:  · Empapa 4 o más apósitos o tampones en 2 horas.  · Tiene sangrado y está embarazada.  ASEGÚRESE DE QUE:  · Comprende estas instrucciones.  · Controlará cox afección.  · Recibirá ayuda de inmediato si no mejora o si empeora.  Esta información no tiene jaison fin reemplazar el consejo del médico. Asegúrese de hacerle al médico cualquier pregunta que tenga.  Document Released: 01/20/2012 Document Revised: 08/20/2014 Document Reviewed: 06/19/2014  Elsevier Interactive Patient Education © 2017 [employer]      Anemia, preguntas frecuentes  (Anemia, Frequently Asked Questions)  ¿CUÁLES SON LOS SÍNTOMAS DE LA ANEMIA?  · Dolor de anabela   · Dificultad para pensar.   · Fatiga.   · Falta de aire.   · Debilidad.   · Frecuencia cardíaca rápida.   ¿EN QUÉ PUNTO SE PUEDE CONSIDERAR A TARIK PERSONA ANÉMICA?   Selden varía con el sexo y la edad.   · Para definir la anemia se utilizan los valores de hemoglobina (Hgb) y hematocrito. Estos valores de laboratorio se obtienen de un recuento completo de nasreen (CBC) completo. Se realiza en el consultorio del médico.   · El rango normal de valores de hemoglobina para adultos hombres es de 14.0 g/dL a 17.4 g/dL. Para mujeres no embarazadas, los valores son de 12.3 g/dL a 15.3 g/dL.   · La [employer] [place] define el valor de la anemia en menos de 12 g/dL para  mujeres no embarazadas y menos de 13 g/dL para hombres.   · Para hombres adultos, el promedio normal de hematocrito es de 46% y el rango varía entre 40% y 52%.   · Para mujeres adultas, el promedio normal de hematocrito es de 41% y el rango varía entre 35% y 47%.   · Los valores que caen por debajo de los límites normales pueden ser síntoma de anemia y deben tener un mayor control (evaluación).   GRUPOS DE PERSONAS QUE TIENEN UN MAYOR RIESGO DE DESARROLLAR ANEMIA:   · [place] que se alimentan del pecho de la madre o que ingieren preparado para bebés no fortificado con lila.   · Carla que están atravesando un período de crecimiento rápido. El lila disponible no puede cumplir con las necesidades de los glóbulos rojos que deben crecer junto con el carla.   · Mujeres en edad fértil. Necesitan lila debido a la pérdida de nasreen hans la menstruación.   · Mujeres embarazadas. El feto en crecimiento crea tarik magdaleno demanda de lila.   · Las personas con hemorragia gastrointestinal continua están en riesgo de desarrollar tarik deficiencia de lila.   · Individuos con leucemia o cáncer que deben recibir quimioterapia o radiación para tratar cox enfermedad. Las drogas o radiación utilizadas para tratar estas enfermedades a menudo disminuyen la capacidad de la médula ósea para producir todo tipo de glóbulos: faustina glóbulos rojos, blancos o plaquetas.   · Individuos con enfermedades inflamatorias crónicas jaison artritis reumatoidea o infecciones crónicas.   · Los ancianos.   ¿EXISTE ALGÚN TIPO DE ANEMIA QUE SEA HEREDITARIO?   · Si, algunos tipos de anemia se deben a defectos heredados o genéticos.   · Anemia drepanocítica. [place] ocurre con más frecuencia en personas descendientes de africanos, afro-americanos y mediterráneos.   · Talasemia (o anemia de [place]). [place] tipo de anemia se encuentran en personas descendientes de mediterráneos y del royer de [place]. Estos tipos de anemia son muy comunes.   · Anemia de [place]. [place]  trastorno es muy poco común.   ¿ES POSIBLE QUE CIERTOS MEDICAMENTOS PRODUZCAN ANEMIA?   Sí. Por ejemplo, las drogas para combatir el cáncer (agentes químico-terapéuticos) a menudo causan anemia. Estas drogas pueden disminuir la capacidad de la médula ósea para producir glóbulos rojos. Si no hay suficientes glóbulos rojos, el cuerpo no irina la cantidad adecuada de oxígeno.  ¿QUÉ NIVEL DE HEMATOCRITO ES SUFICIENTE PARA DONAR NASREEN?   La zeferino cantidad aceptable de hematocrito para donantes es de 38%. Si tiene un valor de hematocrito bajo, deberá concertar tarik jurgen con el médico.  ¿SUELEN REALIZARSE TRANSFUSIONES PARA TRATAR LA ANEMIA? ¿SON PELIGROSAS?   Se utilizan jaison último recurso para tratar la anemia. El medico encontrará la causa de la anemia y de ser posible la corregirá. La mayoría de las transfusiones se realizan debido a tarik hemorragia excesiva hans tarik cirugía, por traumatismos, o debido a tarik supresión de la médula ósea en pacientes con cáncer o leucemia hans la quimioterapia. Las transfusiones son mucho más seguras que antes. También es sabido que las transfusiones afectan al sistema inmune y pueden aumentar ciertos riesgos. Existe tarik posibilidad de error humano, en la que 1 de cada 16.000 transfusiones puede resultar en que el paciente reciba tarik transfusión que no concuerda con cox tipo de nasreen.   ¿QUÉ ES LA DEFICIENCIA DE SAMUEL? ¿PUEDO CORREGIRLA CON UN CAMBIO EN MI DIETA?   El samuel es tarik parte esencial de la hemoglobina. Sin hemoglobina suficiente, se desarrolla anemia y el cuerpo no irina la cantidad suficiente de oxígeno. La anemia por deficiencia de samuel se desarrolla tarik vez que el cuerpo ha tenido un bajo nivel de samuel hans mucho tiempo. [place] puede ocasionarse tanto por pérdida de nasreen, no deepak o absorber samuel suficiente, o un aumento en la demanda de samuel (jaison hans el embarazo o el rápido crecimiento).   Los alimentos de origen animal jaison carne de bryan, michela o  cerdo, son buenas martinez de samuel. Asegúrese de consumir michelle de estos alimentos con cada comida. La vitamina C le ayuda a que el cuerpo absorba samuel. Los alimentos ricos en vitamina C incluyen cítricos, pimientos, fresas, espinacas y cantalupos. En algunos casos puede ser necesario un suplemento de samuel para asegurarse de corregir la deficiencia de samuel. En el tee de yobany yasmin absorción, el samuel extra deberá administrarse directamente en la vena con yobany inyección (vía intravenosa).  ME GLASS DIAGNOSTICADO ANEMIA POR DEFICIENCIA DE SAMUEL Y EL MÉDICO ME DIEGO RECETADO SUPLEMENTOS DE SAMUEL. ¿CUÁNTO TIEMPO LIZET TOMARLO PARA LLEGAR A LOS NIVELES NORMALES?   Depende del ab de anemia al comienzo del tratamiento. La mayoría de las personas con deficiencia de samuel leve a moderada, recuperará los niveles normales después de 2 o 3 meses. Sin embargo yobany vez corregida la anemia, el samuel almacenado en el cuerpo sigue siendo bajo. Los médicos suelen sugerir yobany terapia adicional de samuel por 6 meses yobany vez que se ha corregido la anemia. [place] ayudará a prevenir que la anemia por deficiencia de samuel reaparezca rápidamente.   MI NIVEL DE HEMOGLOBINA ES DE 9 G/DL Y LIZET REALIZARME YOBANY CIRUGÍA. ¿DEBERÍA POSPONERLA?   Si tiene un nivel de hemoglobina de 9, deberá comentarlo de inmediato con el profesional que lo asiste. Muchos pacientes con niveles de hemoglobina similares glass sido intervenidos quirúrgicamente sin problemas. Si se espera que haya yobany mínima pérdida de nasreen de un procedimiento zeferino, no será necesario tratamiento alguno.   Si se espera yobany pérdida de nasreen mayor para procedimientos más extensivos, deberá preguntar al médico acerca de realizar un tratamiento con eritropoyetina y samuel para acelerar la recuperación de la hemoglobina a niveles normales antes de la cirugía. Un paciente anémico que debe realizarse yobany cirugía que conlleva yobany gran pérdida de nasreen tiene un mayor riesgo de sufrir  complicaciones y necesitará tarik transfusión, que también conlleva algún riesgo.   HE OÍDO QUE LOS PERÍODOS MENSTRUALES PRISCILA CAUSAN ANEMIA. ¿HAY ALGO QUE PUEDA HACER PARA PREVERNIRLA?   La anemia que resulta de períodos con pérdidas menstruales abundantes suele deberse a tarik deficiencia de lila. Puede intentar cubrir la demanda creciente de lila que se ocasiona por las pérdidas menstruales priscila mediante el aumento del consumo de alimentos ricos en lila. Podrán ser necesarios suplementos de lila. Consulte con el médico si hay algo que le preocupa.  ¿QUÉ OCASIONA LA ANEMIA RYAN EL EMBARAZO?   El embarazo realiza grandes demandas al cuerpo. La madre debe cumplir con las necesidades tanto de cox cuerpo jaison del bebé por nacer. El cuerpo necesita lila y folato suficientes para realizar tarik adecuada cantidad de glóbulos rojos. Para prevenir la anemia yran el embarazo, la madre deberá permanecer en contacto vicente con el médico.   Asegúrese de consumir tarik dieta ambrosio en lila y folato jaison hígado y vegetales de hoja oscura. El folato cumple un papel muy importante en el desarrollo normal de la médula snow del bebé El folato puede ayudar a prevenir afecciones graves jaison la charly bífida. Si la dieta que consume no le proporciona los nutrientes adecuados, deberá conversar con el médico acerca del consumo de suplementos nutricionales.   ¿CUÁL ES LA RELACIÓN ENTRE LOS TUMORES FIBROIDES Y LA ANEMIA EN LAS MUJERES?   La relación suele estar ocasionada por un aumento en la pérdida de flujo menstrual ocasionado por los fibroides. Se requerirá tarik buena ingesta de lial para prevenir cox deficiencia y evitar que se desarrolle la anemia.   Document Released: 03/16/2010 Document Revised: 03/11/2013  [employer]® Patient Information ©2013 [employer].

## 2018-04-04 NOTE — ED TRIAGE NOTES
Assessment made. Chart up for MD to see. IV placed. Blood drawn and sent to lab. Per patient and  changing pads every 30 mins. Soaked with blood. Pale in appearance. Armenian speaking.  translating.

## 2018-04-06 ENCOUNTER — HOSPITAL ENCOUNTER (OUTPATIENT)
Facility: MEDICAL CENTER | Age: 48
End: 2018-04-07
Attending: INTERNAL MEDICINE | Admitting: INTERNAL MEDICINE
Payer: COMMERCIAL

## 2018-04-06 ENCOUNTER — HOSPITAL ENCOUNTER (OUTPATIENT)
Facility: MEDICAL CENTER | Age: 48
End: 2018-04-06

## 2018-04-06 ENCOUNTER — HOSPITAL ENCOUNTER (OUTPATIENT)
Facility: MEDICAL CENTER | Age: 48
End: 2018-04-06
Attending: INTERNAL MEDICINE
Payer: COMMERCIAL

## 2018-04-06 VITALS
RESPIRATION RATE: 18 BRPM | HEIGHT: 63 IN | WEIGHT: 227.29 LBS | SYSTOLIC BLOOD PRESSURE: 125 MMHG | TEMPERATURE: 97.4 F | OXYGEN SATURATION: 99 % | BODY MASS INDEX: 40.27 KG/M2 | HEART RATE: 70 BPM | DIASTOLIC BLOOD PRESSURE: 73 MMHG

## 2018-04-06 LAB
ABO GROUP BLD: NORMAL
ALBUMIN SERPL BCP-MCNC: 3 G/DL (ref 3.2–4.9)
ALBUMIN/GLOB SERPL: 0.9 G/DL
ALP SERPL-CCNC: 111 U/L (ref 30–99)
ALT SERPL-CCNC: 25 U/L (ref 2–50)
ANION GAP SERPL CALC-SCNC: 11 MMOL/L (ref 0–11.9)
AST SERPL-CCNC: 40 U/L (ref 12–45)
BARCODED ABORH UBTYP: 5100
BARCODED ABORH UBTYP: 5100
BARCODED PRD CODE UBPRD: NORMAL
BARCODED PRD CODE UBPRD: NORMAL
BARCODED UNIT NUM UBUNT: NORMAL
BARCODED UNIT NUM UBUNT: NORMAL
BILIRUB SERPL-MCNC: 0.5 MG/DL (ref 0.1–1.5)
BLD GP AB SCN SERPL QL: NORMAL
BUN SERPL-MCNC: 21 MG/DL (ref 8–22)
CALCIUM SERPL-MCNC: 8.5 MG/DL (ref 8.5–10.5)
CHLORIDE SERPL-SCNC: 99 MMOL/L (ref 96–112)
CO2 SERPL-SCNC: 22 MMOL/L (ref 20–33)
COMPONENT R 8504R: NORMAL
COMPONENT R 8504R: NORMAL
CREAT SERPL-MCNC: 2 MG/DL (ref 0.5–1.4)
GLOBULIN SER CALC-MCNC: 3.3 G/DL (ref 1.9–3.5)
GLUCOSE SERPL-MCNC: 192 MG/DL (ref 65–99)
POTASSIUM SERPL-SCNC: 3.3 MMOL/L (ref 3.6–5.5)
PRODUCT TYPE UPROD: NORMAL
PRODUCT TYPE UPROD: NORMAL
PROT SERPL-MCNC: 6.3 G/DL (ref 6–8.2)
RH BLD: NORMAL
SODIUM SERPL-SCNC: 132 MMOL/L (ref 135–145)
UNIT STATUS USTAT: NORMAL
UNIT STATUS USTAT: NORMAL

## 2018-04-06 PROCEDURE — P9016 RBC LEUKOCYTES REDUCED: HCPCS

## 2018-04-06 PROCEDURE — 86923 COMPATIBILITY TEST ELECTRIC: CPT

## 2018-04-06 PROCEDURE — G0378 HOSPITAL OBSERVATION PER HR: HCPCS

## 2018-04-06 PROCEDURE — 700102 HCHG RX REV CODE 250 W/ 637 OVERRIDE(OP): Performed by: INTERNAL MEDICINE

## 2018-04-06 PROCEDURE — 80053 COMPREHEN METABOLIC PANEL: CPT

## 2018-04-06 PROCEDURE — 86644 CMV ANTIBODY: CPT

## 2018-04-06 PROCEDURE — 86901 BLOOD TYPING SEROLOGIC RH(D): CPT

## 2018-04-06 PROCEDURE — 36415 COLL VENOUS BLD VENIPUNCTURE: CPT

## 2018-04-06 PROCEDURE — A9270 NON-COVERED ITEM OR SERVICE: HCPCS | Performed by: INTERNAL MEDICINE

## 2018-04-06 PROCEDURE — 36430 TRANSFUSION BLD/BLD COMPNT: CPT

## 2018-04-06 PROCEDURE — 86850 RBC ANTIBODY SCREEN: CPT

## 2018-04-06 RX ORDER — ACETAMINOPHEN 325 MG/1
650 TABLET ORAL
Status: COMPLETED | OUTPATIENT
Start: 2018-04-06 | End: 2018-04-06

## 2018-04-06 RX ORDER — DIPHENHYDRAMINE HCL 25 MG
25 TABLET ORAL
Status: COMPLETED | OUTPATIENT
Start: 2018-04-06 | End: 2018-04-06

## 2018-04-06 RX ADMIN — DIPHENHYDRAMINE HCL 25 MG: 25 TABLET ORAL at 16:28

## 2018-04-06 RX ADMIN — ACETAMINOPHEN 650 MG: 325 TABLET, FILM COATED ORAL at 16:28

## 2018-04-06 ASSESSMENT — PAIN SCALES - GENERAL
PAINLEVEL_OUTOF10: 0

## 2018-04-06 ASSESSMENT — LIFESTYLE VARIABLES
EVER_SMOKED: NEVER
DO YOU DRINK ALCOHOL: NO

## 2018-04-06 ASSESSMENT — PATIENT HEALTH QUESTIONNAIRE - PHQ9
2. FEELING DOWN, DEPRESSED, IRRITABLE, OR HOPELESS: NOT AT ALL
SUM OF ALL RESPONSES TO PHQ9 QUESTIONS 1 AND 2: 0
1. LITTLE INTEREST OR PLEASURE IN DOING THINGS: NOT AT ALL

## 2018-04-06 NOTE — PROGRESS NOTES
15 minute vitals done, VSS. Patient tolerating transfusion well. NAD noted. Denies needs at this time.

## 2018-04-06 NOTE — PROGRESS NOTES
Patient is a direct admit from [facility], patient of Dr. Fuentes's for anemia and kidney cancer.  Dr. Fuentes is admitting the patient.  Written orders received, faxed to unit and scanned into epic on 4/6.  Held orders in epic to be released upon patients arrival to unit.

## 2018-04-06 NOTE — PROGRESS NOTES
Assessment completed. Pt A&Ox4. Albanian speaking, able to communicate with this RN. Dyspnea with exertion, sating 99% on RA. Pt denies pain at this time. Monitors applied, VS stable, call light and belongings within reach. Non skid socks provided. POC updated. Pt educated on room and call light, pt verbalized understanding. Communication board updated. Needs met.

## 2018-04-07 PROCEDURE — G0378 HOSPITAL OBSERVATION PER HR: HCPCS

## 2018-04-07 NOTE — PROGRESS NOTES
Pt discharge per MD orders, VSS, instructions and paper works  Discussed with pt,questions and concerns  Answered ,understood, at bedside,pt's ride home,belongings sent with pt, wheeled down by staff to car.

## 2018-04-07 NOTE — DISCHARGE INSTRUCTIONS
Discharge Instructions    Discharged to home by car with relative. Discharged via wheelchair, hospital escort: Refused.  Special equipment needed: Not Applicable    Be sure to schedule a follow-up appointment with your primary care doctor or any specialists as instructed.   Patient to return to clinic on 04-9-18  Discharge Plan:   Influenza Vaccine Indication: Patient Refuses, Not indicated: History of severe allergic reaction to influenza vaccine    I understand that a diet low in cholesterol, fat, and sodium is recommended for good health. Unless I have been given specific instructions below for another diet, I accept this instruction as my diet prescription.   Other diet: regular    Special Instructions: None    · Is patient discharged on Warfarin / Coumadin?   No     Depression / Suicide Risk    As you are discharged from this [facility] facility, it is important to learn how to keep safe from harming yourself.    Recognize the warning signs:  · Abrupt changes in personality, positive or negative- including increase in energy   · Giving away possessions  · Change in eating patterns- significant weight changes-  positive or negative  · Change in sleeping patterns- unable to sleep or sleeping all the time   · Unwillingness or inability to communicate  · Depression  · Unusual sadness, discouragement and loneliness  · Talk of wanting to die  · Neglect of personal appearance   · Rebelliousness- reckless behavior  · Withdrawal from people/activities they love  · Confusion- inability to concentrate     If you or a loved one observes any of these behaviors or has concerns about self-harm, here's what you can do:  · Talk about it- your feelings and reasons for harming yourself  · Remove any means that you might use to hurt yourself (examples: pills, rope, extension cords, firearm)  · Get professional help from the community (Mental Health, Substance Abuse, psychological counseling)  · Do not be alone:Call your Safe  Contact- someone whom you trust who will be there for you.  · Call your local CRISIS HOTLINE 225-9011 or 772-496-5087  · Call your local Children's Mobile Crisis Response Team [place] (627) 090-9989 or www.[employer]  · Call the toll free National Suicide Prevention Hotlines   · National Suicide Prevention Lifeline 043-308-ABXQ (6031)  · National [place] Line Network 800-SUICIDE (757-6821)

## 2018-04-09 ENCOUNTER — HOSPITAL ENCOUNTER (OUTPATIENT)
Facility: MEDICAL CENTER | Age: 48
End: 2018-04-09
Attending: INTERNAL MEDICINE
Payer: COMMERCIAL

## 2018-04-09 LAB
ALBUMIN SERPL BCP-MCNC: 2.8 G/DL (ref 3.2–4.9)
ALBUMIN/GLOB SERPL: 0.9 G/DL
ALP SERPL-CCNC: 125 U/L (ref 30–99)
ALT SERPL-CCNC: 29 U/L (ref 2–50)
ANION GAP SERPL CALC-SCNC: 8 MMOL/L (ref 0–11.9)
AST SERPL-CCNC: 41 U/L (ref 12–45)
BILIRUB SERPL-MCNC: 0.6 MG/DL (ref 0.1–1.5)
BUN SERPL-MCNC: 19 MG/DL (ref 8–22)
CALCIUM SERPL-MCNC: 8.1 MG/DL (ref 8.5–10.5)
CHLORIDE SERPL-SCNC: 99 MMOL/L (ref 96–112)
CO2 SERPL-SCNC: 27 MMOL/L (ref 20–33)
CREAT SERPL-MCNC: 1.62 MG/DL (ref 0.5–1.4)
GLOBULIN SER CALC-MCNC: 3.1 G/DL (ref 1.9–3.5)
GLUCOSE SERPL-MCNC: 180 MG/DL (ref 65–99)
POTASSIUM SERPL-SCNC: 3.4 MMOL/L (ref 3.6–5.5)
PROT SERPL-MCNC: 5.9 G/DL (ref 6–8.2)
SODIUM SERPL-SCNC: 134 MMOL/L (ref 135–145)

## 2018-04-09 PROCEDURE — 80053 COMPREHEN METABOLIC PANEL: CPT

## 2018-04-12 ENCOUNTER — APPOINTMENT (OUTPATIENT)
Dept: RADIOLOGY | Facility: MEDICAL CENTER | Age: 48
DRG: 760 | End: 2018-04-12
Attending: EMERGENCY MEDICINE
Payer: COMMERCIAL

## 2018-04-12 ENCOUNTER — RESOLUTE PROFESSIONAL BILLING HOSPITAL PROF FEE (OUTPATIENT)
Dept: HOSPITALIST | Facility: MEDICAL CENTER | Age: 48
End: 2018-04-12
Payer: COMMERCIAL

## 2018-04-12 ENCOUNTER — HOSPITAL ENCOUNTER (INPATIENT)
Facility: MEDICAL CENTER | Age: 48
LOS: 1 days | DRG: 760 | End: 2018-04-13
Attending: EMERGENCY MEDICINE | Admitting: INTERNAL MEDICINE
Payer: COMMERCIAL

## 2018-04-12 DIAGNOSIS — D50.0 IRON DEFICIENCY ANEMIA DUE TO CHRONIC BLOOD LOSS: ICD-10-CM

## 2018-04-12 LAB
ABO GROUP BLD: NORMAL
ALBUMIN SERPL BCP-MCNC: 2.9 G/DL (ref 3.2–4.9)
ALBUMIN/GLOB SERPL: 0.9 G/DL
ALP SERPL-CCNC: 145 U/L (ref 30–99)
ALT SERPL-CCNC: 26 U/L (ref 2–50)
ANION GAP SERPL CALC-SCNC: 9 MMOL/L (ref 0–11.9)
APTT PPP: 32 SEC (ref 24.7–36)
AST SERPL-CCNC: 34 U/L (ref 12–45)
BARCODED ABORH UBTYP: 5100
BARCODED ABORH UBTYP: 9500
BARCODED PRD CODE UBPRD: NORMAL
BARCODED PRD CODE UBPRD: NORMAL
BARCODED UNIT NUM UBUNT: NORMAL
BARCODED UNIT NUM UBUNT: NORMAL
BASOPHILS # BLD AUTO: 0.3 % (ref 0–1.8)
BASOPHILS # BLD: 0.01 K/UL (ref 0–0.12)
BILIRUB SERPL-MCNC: 0.5 MG/DL (ref 0.1–1.5)
BLD GP AB SCN SERPL QL: NORMAL
BUN SERPL-MCNC: 21 MG/DL (ref 8–22)
CALCIUM SERPL-MCNC: 8.3 MG/DL (ref 8.5–10.5)
CHLORIDE SERPL-SCNC: 100 MMOL/L (ref 96–112)
CO2 SERPL-SCNC: 24 MMOL/L (ref 20–33)
COMPONENT R 8504R: NORMAL
COMPONENT R 8504R: NORMAL
CREAT SERPL-MCNC: 1.68 MG/DL (ref 0.5–1.4)
EOSINOPHIL # BLD AUTO: 0.03 K/UL (ref 0–0.51)
EOSINOPHIL NFR BLD: 0.8 % (ref 0–6.9)
ERYTHROCYTE [DISTWIDTH] IN BLOOD BY AUTOMATED COUNT: 47.6 FL (ref 35.9–50)
GLOBULIN SER CALC-MCNC: 3.4 G/DL (ref 1.9–3.5)
GLUCOSE SERPL-MCNC: 178 MG/DL (ref 65–99)
HCG SERPL QL: NEGATIVE
HCT VFR BLD AUTO: 17.9 % (ref 37–47)
HCT VFR BLD AUTO: 20.1 % (ref 37–47)
HGB BLD-MCNC: 5.9 G/DL (ref 12–16)
HGB BLD-MCNC: 6.4 G/DL (ref 12–16)
HGB BLD-MCNC: 6.5 G/DL (ref 12–16)
IMM GRANULOCYTES # BLD AUTO: 0.01 K/UL (ref 0–0.11)
IMM GRANULOCYTES NFR BLD AUTO: 0.3 % (ref 0–0.9)
INR PPP: 0.96 (ref 0.87–1.13)
LYMPHOCYTES # BLD AUTO: 1.42 K/UL (ref 1–4.8)
LYMPHOCYTES NFR BLD: 36.8 % (ref 22–41)
MCH RBC QN AUTO: 28.8 PG (ref 27–33)
MCHC RBC AUTO-ENTMCNC: 32.3 G/DL (ref 33.6–35)
MCV RBC AUTO: 88.9 FL (ref 81.4–97.8)
MONOCYTES # BLD AUTO: 0.12 K/UL (ref 0–0.85)
MONOCYTES NFR BLD AUTO: 3.1 % (ref 0–13.4)
NEUTROPHILS # BLD AUTO: 2.27 K/UL (ref 2–7.15)
NEUTROPHILS NFR BLD: 58.7 % (ref 44–72)
NRBC # BLD AUTO: 0 K/UL
NRBC BLD-RTO: 0 /100 WBC
PLATELET # BLD AUTO: 155 K/UL (ref 164–446)
PMV BLD AUTO: 10.3 FL (ref 9–12.9)
POTASSIUM SERPL-SCNC: 3 MMOL/L (ref 3.6–5.5)
PRODUCT TYPE UPROD: NORMAL
PRODUCT TYPE UPROD: NORMAL
PROT SERPL-MCNC: 6.3 G/DL (ref 6–8.2)
PROTHROMBIN TIME: 12.5 SEC (ref 12–14.6)
RBC # BLD AUTO: 2.26 M/UL (ref 4.2–5.4)
RH BLD: NORMAL
SODIUM SERPL-SCNC: 133 MMOL/L (ref 135–145)
UNIT STATUS USTAT: NORMAL
UNIT STATUS USTAT: NORMAL
WBC # BLD AUTO: 3.9 K/UL (ref 4.8–10.8)

## 2018-04-12 PROCEDURE — 36430 TRANSFUSION BLD/BLD COMPNT: CPT

## 2018-04-12 PROCEDURE — 99285 EMERGENCY DEPT VISIT HI MDM: CPT

## 2018-04-12 PROCEDURE — P9016 RBC LEUKOCYTES REDUCED: HCPCS

## 2018-04-12 PROCEDURE — 36415 COLL VENOUS BLD VENIPUNCTURE: CPT

## 2018-04-12 PROCEDURE — 93005 ELECTROCARDIOGRAM TRACING: CPT | Performed by: EMERGENCY MEDICINE

## 2018-04-12 PROCEDURE — 85610 PROTHROMBIN TIME: CPT

## 2018-04-12 PROCEDURE — 99223 1ST HOSP IP/OBS HIGH 75: CPT | Mod: GC | Performed by: INTERNAL MEDICINE

## 2018-04-12 PROCEDURE — 86644 CMV ANTIBODY: CPT

## 2018-04-12 PROCEDURE — 700105 HCHG RX REV CODE 258: Performed by: INTERNAL MEDICINE

## 2018-04-12 PROCEDURE — 85730 THROMBOPLASTIN TIME PARTIAL: CPT

## 2018-04-12 PROCEDURE — 86901 BLOOD TYPING SEROLOGIC RH(D): CPT

## 2018-04-12 PROCEDURE — 71045 X-RAY EXAM CHEST 1 VIEW: CPT

## 2018-04-12 PROCEDURE — 85014 HEMATOCRIT: CPT

## 2018-04-12 PROCEDURE — 700105 HCHG RX REV CODE 258: Performed by: EMERGENCY MEDICINE

## 2018-04-12 PROCEDURE — 700111 HCHG RX REV CODE 636 W/ 250 OVERRIDE (IP): Performed by: EMERGENCY MEDICINE

## 2018-04-12 PROCEDURE — 770009 HCHG ROOM/CARE - ONCOLOGY SEMI PRI*

## 2018-04-12 PROCEDURE — 86850 RBC ANTIBODY SCREEN: CPT

## 2018-04-12 PROCEDURE — 700102 HCHG RX REV CODE 250 W/ 637 OVERRIDE(OP): Performed by: EMERGENCY MEDICINE

## 2018-04-12 PROCEDURE — 85025 COMPLETE CBC W/AUTO DIFF WBC: CPT

## 2018-04-12 PROCEDURE — 76830 TRANSVAGINAL US NON-OB: CPT

## 2018-04-12 PROCEDURE — 86923 COMPATIBILITY TEST ELECTRIC: CPT

## 2018-04-12 PROCEDURE — A9270 NON-COVERED ITEM OR SERVICE: HCPCS | Performed by: EMERGENCY MEDICINE

## 2018-04-12 PROCEDURE — 30233N1 TRANSFUSION OF NONAUTOLOGOUS RED BLOOD CELLS INTO PERIPHERAL VEIN, PERCUTANEOUS APPROACH: ICD-10-PCS | Performed by: EMERGENCY MEDICINE

## 2018-04-12 PROCEDURE — 84703 CHORIONIC GONADOTROPIN ASSAY: CPT

## 2018-04-12 PROCEDURE — 86900 BLOOD TYPING SEROLOGIC ABO: CPT

## 2018-04-12 PROCEDURE — 96374 THER/PROPH/DIAG INJ IV PUSH: CPT

## 2018-04-12 PROCEDURE — 85018 HEMOGLOBIN: CPT

## 2018-04-12 PROCEDURE — 80053 COMPREHEN METABOLIC PANEL: CPT

## 2018-04-12 RX ORDER — ACETAMINOPHEN 500 MG
1000 TABLET ORAL EVERY 6 HOURS PRN
COMMUNITY

## 2018-04-12 RX ORDER — BENZONATATE 100 MG/1
100 CAPSULE ORAL ONCE
Status: COMPLETED | OUTPATIENT
Start: 2018-04-12 | End: 2018-04-12

## 2018-04-12 RX ORDER — AMOXICILLIN 250 MG
2 CAPSULE ORAL 2 TIMES DAILY
Status: DISCONTINUED | OUTPATIENT
Start: 2018-04-12 | End: 2018-04-13 | Stop reason: HOSPADM

## 2018-04-12 RX ORDER — SODIUM CHLORIDE 9 MG/ML
INJECTION, SOLUTION INTRAVENOUS CONTINUOUS
Status: ACTIVE | OUTPATIENT
Start: 2018-04-12 | End: 2018-04-13

## 2018-04-12 RX ORDER — FERROUS SULFATE 325(65) MG
325 TABLET ORAL
Status: DISCONTINUED | OUTPATIENT
Start: 2018-04-12 | End: 2018-04-13

## 2018-04-12 RX ORDER — DEXAMETHASONE SODIUM PHOSPHATE 4 MG/ML
10 INJECTION, SOLUTION INTRA-ARTICULAR; INTRALESIONAL; INTRAMUSCULAR; INTRAVENOUS; SOFT TISSUE ONCE
Status: COMPLETED | OUTPATIENT
Start: 2018-04-12 | End: 2018-04-12

## 2018-04-12 RX ORDER — SODIUM CHLORIDE 9 MG/ML
1000 INJECTION, SOLUTION INTRAVENOUS ONCE
Status: COMPLETED | OUTPATIENT
Start: 2018-04-12 | End: 2018-04-12

## 2018-04-12 RX ORDER — POLYETHYLENE GLYCOL 3350 17 G/17G
1 POWDER, FOR SOLUTION ORAL
Status: DISCONTINUED | OUTPATIENT
Start: 2018-04-12 | End: 2018-04-13 | Stop reason: HOSPADM

## 2018-04-12 RX ORDER — BISACODYL 10 MG
10 SUPPOSITORY, RECTAL RECTAL
Status: DISCONTINUED | OUTPATIENT
Start: 2018-04-12 | End: 2018-04-13 | Stop reason: HOSPADM

## 2018-04-12 RX ORDER — ACETAMINOPHEN 325 MG/1
650 TABLET ORAL EVERY 6 HOURS PRN
Status: DISCONTINUED | OUTPATIENT
Start: 2018-04-12 | End: 2018-04-13 | Stop reason: HOSPADM

## 2018-04-12 RX ADMIN — SODIUM CHLORIDE 1000 ML: 9 INJECTION, SOLUTION INTRAVENOUS at 21:30

## 2018-04-12 RX ADMIN — SODIUM CHLORIDE 1000 ML: 9 INJECTION, SOLUTION INTRAVENOUS at 22:29

## 2018-04-12 RX ADMIN — DEXAMETHASONE SODIUM PHOSPHATE 10 MG: 4 INJECTION, SOLUTION INTRAMUSCULAR; INTRAVENOUS at 17:08

## 2018-04-12 RX ADMIN — BENZONATATE 100 MG: 100 CAPSULE ORAL at 17:06

## 2018-04-12 RX ADMIN — SODIUM CHLORIDE: 9 INJECTION, SOLUTION INTRAVENOUS at 14:45

## 2018-04-12 ASSESSMENT — PAIN SCALES - GENERAL
PAINLEVEL_OUTOF10: 0
PAINLEVEL_OUTOF10: 0

## 2018-04-12 NOTE — ED NOTES
Med rec completed by interview with patient's family member at bedside  Pt is only on amlodipine 5 mg daily in the evening  Pt was stopped off all her other medications Friday from the provider due to bleeding per pt's family member (xarelto was one of them)  No abx in past 30 days  NKDA confirmed

## 2018-04-12 NOTE — SENIOR ADMIT NOTE
"    Senior Admit Note    48 y/o F with a h/o renal cell cancer s/p radical nephrectomy 1/2018 on Sunitinib, JACOB, LESA, Vitamin D deficiency p/w anemia.  Mrs. Coto developed vaginal bleed and yellow skin discoloration  About 1 month ago since she was started Sunitinib.  She states she has been having vaginal bleed and passing clot everyday.   She her menses usually lasts 4 days.  But, this heavy bleeding is unusual for her.  She c/o generalized weakness and dizziness.   She was evaluated in ER about 2 wks ago.   She was also on Xarelto.  She was evaluated by her oncologist who felt her vaginal bleeding and skin discoloration could be due to Sunitinib.  Her sunitinib and Xarelto was stopped.  Last dose of both meds were last Friday.  However, she continued to have bleeding.  ERP contacted oncologist who felt it is unusual to have bleeding from Sunitinib after stopping it for about 6 days now.   Her last Transvaginal US showed mild endometrial thickness.   Patient denies any abdominal pain, bloody or tarry stool. Her  also reports nausea and vomiting.  Patient has decreased PO intake.     BP (!) 95/46   Pulse 83   Temp 36.4 °C (97.5 °F)   Resp 18   Ht 1.676 m (5' 6\")   Wt 103.4 kg (228 lb)   SpO2 100%   BMI 36.80 kg/m²     Gen: Fatigued.   Skin:  Jaundiced  HEENT:  MMM.  Pale conjunctiva  CVS: RRR, S12S2 wnl. No mrg  Abd: Soft NT ND BS+.  RUQ: well healed surgical scar  Ext: No pitting edema  Rectal Exam: FOBT +.  No rectal mass.    Vaginal exam per ERP.  Large clot in vaginal vault.  No active bleeding.     Recent Labs      04/12/18   1316   WBC  3.9*   RBC  2.26*   HEMOGLOBIN  6.5*   HEMATOCRIT  20.1*   MCV  88.9   MCH  28.8   RDW  47.6   PLATELETCT  155*   MPV  10.3   NEUTSPOLYS  58.70   LYMPHOCYTES  36.80   MONOCYTES  3.10   EOSINOPHILS  0.80   BASOPHILS  0.30     A/P    Anemia   Vaginal Bleeding  - BP borderline.  Not tachycardic  - Hb 6.9.  Stool occult blood +  - Bhcg negative.    PT/INR/aPTT wnl.  "   - Last Fe panel: low fe, %sat.  High normal TIBC. Ferritin low normal.    - Obtain 2 large bore IV   - 1 u pRBC.  Iron tablet.    - NS bolus, IVF.    - H/H TID and transfuse to keep Hb>7  - Anemia work up.    - Gynecologist consulted.  Awaiting rec.    - Oncology consulted. Awaiting rec.       For a detailed history please, refer to intern HPI.

## 2018-04-12 NOTE — ED TRIAGE NOTES
Chief Complaint   Patient presents with   • Vaginal Bleeding     continues since last visit.   • Weakness     and fatigue     To triage by wheelchair for above. Currently being treated for renal cancer. Seen last week and transfused 2 units of blood. Appears fatigued and pale. VSS. Explained triage process, to waiting room. Asked to inform RN if questions or concerns arise.
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
## 2018-04-13 VITALS
SYSTOLIC BLOOD PRESSURE: 119 MMHG | WEIGHT: 228 LBS | HEART RATE: 75 BPM | RESPIRATION RATE: 15 BRPM | OXYGEN SATURATION: 95 % | HEIGHT: 66 IN | BODY MASS INDEX: 36.64 KG/M2 | DIASTOLIC BLOOD PRESSURE: 70 MMHG | TEMPERATURE: 98.7 F

## 2018-04-13 PROBLEM — R19.5 FECAL OCCULT BLOOD TEST POSITIVE: Status: ACTIVE | Noted: 2018-04-13

## 2018-04-13 PROBLEM — C78.00 LUNG METASTASIS: Status: ACTIVE | Noted: 2018-04-13

## 2018-04-13 PROBLEM — N93.8 DYSFUNCTIONAL UTERINE BLEEDING: Status: ACTIVE | Noted: 2018-04-13

## 2018-04-13 PROBLEM — D50.0 BLOOD LOSS ANEMIA: Status: ACTIVE | Noted: 2018-04-13

## 2018-04-13 PROBLEM — C64.1 CLEAR CELL RENAL CELL CARCINOMA, RIGHT (HCC): Status: ACTIVE | Noted: 2018-04-13

## 2018-04-13 PROBLEM — E87.6 HYPOKALEMIA: Status: ACTIVE | Noted: 2018-04-13

## 2018-04-13 LAB
ALBUMIN SERPL BCP-MCNC: 2.4 G/DL (ref 3.2–4.9)
ALBUMIN/GLOB SERPL: 0.8 G/DL
ALP SERPL-CCNC: 118 U/L (ref 30–99)
ALT SERPL-CCNC: 22 U/L (ref 2–50)
ANION GAP SERPL CALC-SCNC: 9 MMOL/L (ref 0–11.9)
AST SERPL-CCNC: 24 U/L (ref 12–45)
BASOPHILS # BLD AUTO: 0 % (ref 0–1.8)
BASOPHILS # BLD: 0 K/UL (ref 0–0.12)
BILIRUB SERPL-MCNC: 0.6 MG/DL (ref 0.1–1.5)
BUN SERPL-MCNC: 17 MG/DL (ref 8–22)
CALCIUM SERPL-MCNC: 7.2 MG/DL (ref 8.5–10.5)
CHLORIDE SERPL-SCNC: 109 MMOL/L (ref 96–112)
CHLORIDE UR-SCNC: 98 MMOL/L
CO2 SERPL-SCNC: 20 MMOL/L (ref 20–33)
CREAT SERPL-MCNC: 1.14 MG/DL (ref 0.5–1.4)
CREAT UR-MCNC: 56.2 MG/DL
EKG IMPRESSION: NORMAL
EOSINOPHIL # BLD AUTO: 0 K/UL (ref 0–0.51)
EOSINOPHIL NFR BLD: 0 % (ref 0–6.9)
ERYTHROCYTE [DISTWIDTH] IN BLOOD BY AUTOMATED COUNT: 47.7 FL (ref 35.9–50)
FERRITIN SERPL-MCNC: 35.8 NG/ML (ref 10–291)
FOLATE SERPL-MCNC: 8.8 NG/ML
GGT SERPL-CCNC: 46 U/L (ref 7–34)
GLOBULIN SER CALC-MCNC: 2.9 G/DL (ref 1.9–3.5)
GLUCOSE SERPL-MCNC: 188 MG/DL (ref 65–99)
HCT VFR BLD AUTO: 22.1 % (ref 37–47)
HCT VFR BLD AUTO: 23.8 % (ref 37–47)
HGB BLD-MCNC: 7.3 G/DL (ref 12–16)
HGB BLD-MCNC: 7.3 G/DL (ref 12–16)
HGB BLD-MCNC: 7.7 G/DL (ref 12–16)
HGB RETIC QN AUTO: 33.5 PG/CELL (ref 29–35)
IMM GRANULOCYTES # BLD AUTO: 0.01 K/UL (ref 0–0.11)
IMM GRANULOCYTES NFR BLD AUTO: 0.5 % (ref 0–0.9)
IMM RETICS NFR: 43.7 % (ref 9.3–17.4)
IRON SATN MFR SERPL: 31 % (ref 15–55)
IRON SERPL-MCNC: 125 UG/DL (ref 40–170)
LYMPHOCYTES # BLD AUTO: 0.44 K/UL (ref 1–4.8)
LYMPHOCYTES NFR BLD: 21.4 % (ref 22–41)
MAGNESIUM SERPL-MCNC: 1.4 MG/DL (ref 1.5–2.5)
MCH RBC QN AUTO: 28.5 PG (ref 27–33)
MCHC RBC AUTO-ENTMCNC: 33 G/DL (ref 33.6–35)
MCV RBC AUTO: 86.3 FL (ref 81.4–97.8)
MONOCYTES # BLD AUTO: 0.01 K/UL (ref 0–0.85)
MONOCYTES NFR BLD AUTO: 0.5 % (ref 0–13.4)
NEUTROPHILS # BLD AUTO: 1.6 K/UL (ref 2–7.15)
NEUTROPHILS NFR BLD: 77.6 % (ref 44–72)
NRBC # BLD AUTO: 0 K/UL
NRBC BLD-RTO: 0 /100 WBC
PLATELET # BLD AUTO: 127 K/UL (ref 164–446)
PMV BLD AUTO: 10.2 FL (ref 9–12.9)
POTASSIUM SERPL-SCNC: 3.5 MMOL/L (ref 3.6–5.5)
POTASSIUM UR-SCNC: 17.8 MMOL/L
PROT SERPL-MCNC: 5.3 G/DL (ref 6–8.2)
RBC # BLD AUTO: 2.56 M/UL (ref 4.2–5.4)
RETICS # AUTO: 0.2 M/UL (ref 0.04–0.06)
RETICS/RBC NFR: 7.4 % (ref 0.8–2.1)
SODIUM SERPL-SCNC: 138 MMOL/L (ref 135–145)
SODIUM UR-SCNC: 97 MMOL/L
TIBC SERPL-MCNC: 402 UG/DL (ref 250–450)
VIT B12 SERPL-MCNC: 1115 PG/ML (ref 211–911)
WBC # BLD AUTO: 2.2 K/UL (ref 4.8–10.8)

## 2018-04-13 PROCEDURE — 700102 HCHG RX REV CODE 250 W/ 637 OVERRIDE(OP): Performed by: INTERNAL MEDICINE

## 2018-04-13 PROCEDURE — 83550 IRON BINDING TEST: CPT

## 2018-04-13 PROCEDURE — 700111 HCHG RX REV CODE 636 W/ 250 OVERRIDE (IP): Performed by: STUDENT IN AN ORGANIZED HEALTH CARE EDUCATION/TRAINING PROGRAM

## 2018-04-13 PROCEDURE — 82607 VITAMIN B-12: CPT

## 2018-04-13 PROCEDURE — 80053 COMPREHEN METABOLIC PANEL: CPT

## 2018-04-13 PROCEDURE — 83735 ASSAY OF MAGNESIUM: CPT

## 2018-04-13 PROCEDURE — 82728 ASSAY OF FERRITIN: CPT

## 2018-04-13 PROCEDURE — 700102 HCHG RX REV CODE 250 W/ 637 OVERRIDE(OP): Performed by: STUDENT IN AN ORGANIZED HEALTH CARE EDUCATION/TRAINING PROGRAM

## 2018-04-13 PROCEDURE — 83540 ASSAY OF IRON: CPT

## 2018-04-13 PROCEDURE — 36415 COLL VENOUS BLD VENIPUNCTURE: CPT

## 2018-04-13 PROCEDURE — 85018 HEMOGLOBIN: CPT

## 2018-04-13 PROCEDURE — 85025 COMPLETE CBC W/AUTO DIFF WBC: CPT

## 2018-04-13 PROCEDURE — 82436 ASSAY OF URINE CHLORIDE: CPT

## 2018-04-13 PROCEDURE — 99238 HOSP IP/OBS DSCHRG MGMT 30/<: CPT | Mod: GC | Performed by: INTERNAL MEDICINE

## 2018-04-13 PROCEDURE — A9270 NON-COVERED ITEM OR SERVICE: HCPCS | Performed by: STUDENT IN AN ORGANIZED HEALTH CARE EDUCATION/TRAINING PROGRAM

## 2018-04-13 PROCEDURE — 84133 ASSAY OF URINE POTASSIUM: CPT

## 2018-04-13 PROCEDURE — A9270 NON-COVERED ITEM OR SERVICE: HCPCS | Performed by: INTERNAL MEDICINE

## 2018-04-13 PROCEDURE — 82570 ASSAY OF URINE CREATININE: CPT

## 2018-04-13 PROCEDURE — 85014 HEMATOCRIT: CPT

## 2018-04-13 PROCEDURE — 84300 ASSAY OF URINE SODIUM: CPT

## 2018-04-13 PROCEDURE — 700105 HCHG RX REV CODE 258: Performed by: STUDENT IN AN ORGANIZED HEALTH CARE EDUCATION/TRAINING PROGRAM

## 2018-04-13 PROCEDURE — 82977 ASSAY OF GGT: CPT

## 2018-04-13 PROCEDURE — 85046 RETICYTE/HGB CONCENTRATE: CPT

## 2018-04-13 PROCEDURE — 82746 ASSAY OF FOLIC ACID SERUM: CPT

## 2018-04-13 RX ORDER — FERROUS SULFATE 325(65) MG
325 TABLET ORAL DAILY
Status: DISCONTINUED | OUTPATIENT
Start: 2018-04-14 | End: 2018-04-13 | Stop reason: HOSPADM

## 2018-04-13 RX ORDER — SODIUM CHLORIDE 9 MG/ML
INJECTION, SOLUTION INTRAVENOUS CONTINUOUS
Status: DISCONTINUED | OUTPATIENT
Start: 2018-04-13 | End: 2018-04-13 | Stop reason: HOSPADM

## 2018-04-13 RX ORDER — POTASSIUM CHLORIDE 20 MEQ/1
20 TABLET, EXTENDED RELEASE ORAL DAILY
Qty: 7 TAB | Refills: 0 | Status: SHIPPED | OUTPATIENT
Start: 2018-04-13 | End: 2018-04-20

## 2018-04-13 RX ORDER — MAGNESIUM SULFATE HEPTAHYDRATE 40 MG/ML
2 INJECTION, SOLUTION INTRAVENOUS ONCE
Status: COMPLETED | OUTPATIENT
Start: 2018-04-13 | End: 2018-04-13

## 2018-04-13 RX ORDER — POTASSIUM CHLORIDE 20 MEQ/1
40 TABLET, EXTENDED RELEASE ORAL 2 TIMES DAILY
Status: DISCONTINUED | OUTPATIENT
Start: 2018-04-13 | End: 2018-04-13 | Stop reason: HOSPADM

## 2018-04-13 RX ORDER — FERROUS SULFATE 325(65) MG
325 TABLET ORAL DAILY
Qty: 30 TAB | Refills: 3 | Status: SHIPPED | OUTPATIENT
Start: 2018-04-14

## 2018-04-13 RX ADMIN — VITAMIN D, TAB 1000IU (100/BT) 1000 UNITS: 25 TAB at 11:27

## 2018-04-13 RX ADMIN — Medication 325 MG: at 10:04

## 2018-04-13 RX ADMIN — SODIUM CHLORIDE: 9 INJECTION, SOLUTION INTRAVENOUS at 05:15

## 2018-04-13 RX ADMIN — MAGNESIUM SULFATE IN WATER 2 G: 40 INJECTION, SOLUTION INTRAVENOUS at 10:02

## 2018-04-13 RX ADMIN — Medication 325 MG: at 11:27

## 2018-04-13 RX ADMIN — POTASSIUM CHLORIDE 40 MEQ: 1500 TABLET, EXTENDED RELEASE ORAL at 05:15

## 2018-04-13 ASSESSMENT — ENCOUNTER SYMPTOMS
SHORTNESS OF BREATH: 0
PALPITATIONS: 0
MYALGIAS: 0
FALLS: 0
HEMOPTYSIS: 0
BLOOD IN STOOL: 0
EYE REDNESS: 0
WEAKNESS: 1
POLYDIPSIA: 0
ROS SKIN COMMENTS: YELLOW
BRUISES/BLEEDS EASILY: 1
EYE DISCHARGE: 0
FLANK PAIN: 0
HEADACHES: 0

## 2018-04-13 ASSESSMENT — COGNITIVE AND FUNCTIONAL STATUS - GENERAL
TOILETING: A LITTLE
DAILY ACTIVITIY SCORE: 22
WALKING IN HOSPITAL ROOM: A LITTLE
MOVING TO AND FROM BED TO CHAIR: A LITTLE
MOBILITY SCORE: 20
SUGGESTED CMS G CODE MODIFIER DAILY ACTIVITY: CJ
CLIMB 3 TO 5 STEPS WITH RAILING: A LITTLE
STANDING UP FROM CHAIR USING ARMS: A LITTLE
DRESSING REGULAR LOWER BODY CLOTHING: A LITTLE
SUGGESTED CMS G CODE MODIFIER MOBILITY: CJ

## 2018-04-13 ASSESSMENT — PAIN SCALES - GENERAL
PAINLEVEL_OUTOF10: 0

## 2018-04-13 NOTE — PROGRESS NOTES
2 RN skin check will Tiki RN:    Elbows, sacrum, heels, behind ears, and all other bony prominences have no signs of breakdown. No need for further interventions at this time.

## 2018-04-13 NOTE — PROGRESS NOTES
Patient brought to floor from oncology. Patient Citizen of the [place] speaking only,  at bedside. All personal belongings brought with patient. Patient placed on monitor and monitor room notified, patient in sinus rhythm 70's.     1 pack RBC's continuing to be administered as well as 1L bolus. Vitals obtained, will continue to monitor.

## 2018-04-13 NOTE — DISCHARGE INSTRUCTIONS
Discharge Instructions    Discharged to home by car with relative. Discharged via wheelchair, hospital escort: Yes.  Special equipment needed: Not Applicable    Be sure to schedule a follow-up appointment with your primary care doctor or any specialists as instructed.     Discharge Plan:   Influenza Vaccine Indication: Patient Refuses    I understand that a diet low in cholesterol, fat, and sodium is recommended for good health. Unless I have been given specific instructions below for another diet, I accept this instruction as my diet prescription.   Other diet: Regular    Special Instructions: None    · Is patient discharged on Warfarin / Coumadin?   No     Depression / Suicide Risk    As you are discharged from this [facility] facility, it is important to learn how to keep safe from harming yourself.    Recognize the warning signs:  · Abrupt changes in personality, positive or negative- including increase in energy   · Giving away possessions  · Change in eating patterns- significant weight changes-  positive or negative  · Change in sleeping patterns- unable to sleep or sleeping all the time   · Unwillingness or inability to communicate  · Depression  · Unusual sadness, discouragement and loneliness  · Talk of wanting to die  · Neglect of personal appearance   · Rebelliousness- reckless behavior  · Withdrawal from people/activities they love  · Confusion- inability to concentrate     If you or a loved one observes any of these behaviors or has concerns about self-harm, here's what you can do:  · Talk about it- your feelings and reasons for harming yourself  · Remove any means that you might use to hurt yourself (examples: pills, rope, extension cords, firearm)  · Get professional help from the community (Mental Health, Substance Abuse, psychological counseling)  · Do not be alone:Call your Safe Contact- someone whom you trust who will be there for you.  · Call your local CRISIS HOTLINE 250-9968 or  015-848-3772  · Call your local Children's Mobile Crisis Response Team [place] (465) 792-8341 or www.SnapLayout.emaze  · Call the toll free National Suicide Prevention Hotlines   · National Suicide Prevention Lifeline 253-050-FYJQ (7341)  · National THINK360 Line Network 800-SUICIDE (054-6152)

## 2018-04-13 NOTE — H&P
ADDENDUM:    LABORATORY DATA:  Her H and H is 6.5 and 20.1, platelets are 155.  Her sodium   is 133, potassium 3.0, chloride 100, CO2 of 24, glucose 178, creatinine 0.68,   AST 34, and ALT 26.  Her PT 12.5, INR 0.96, APTT 32.  Her beta-hCG was   negative.  She is O positive.  Antibody screen negative.       ____________________________________     MD JOSE TORREZ / MONICA    DD:  04/12/2018 19:57:07  DT:  04/12/2018 21:59:42    D#:  [id number]  Job#:  [id number]

## 2018-04-13 NOTE — ED PROVIDER NOTES
ED Provider Note    CHIEF COMPLAINT  Chief Complaint   Patient presents with   • Vaginal Bleeding     continues since last visit.   • Weakness     and fatigue       HPI  Eva Castellano is a 47 y.o. female who presents to the emergency department complaining of severe weakness and continued vaginal bleeding. The patient has recently been diagnosed with renal cancer with known metastases to the lungs. She had a right nephrectomy done in January. The patient's postoperative course was complicated by a blood clot in the IVC however I cannot find any notes or imaging to confirm this part of the history. In any event the patient was started on Xarelto. She is also subsequently started on Sutent oral chemotherapy. For the last month she has been having vaginal bleeding which is been about as heavy as a period but with occasional blood clotting. She was seen in the emergency department on the 4th of this month and was evaluated and found to have a hemoglobin of 8.9 at that time and 2 days later she was evaluated by her oncologist and had 2 units of packed red blood cells transfused and the Xarelto and the Sutent were discontinued. Unfortunately the patient continues to have vaginal bleeding and went through about 5 pads last night. Bleeding seems to mostly have subsided at this time but the patient is so weak that she cannot get out of bed so her  has brought her today to the emergency department for evaluation. The patient has not been seen by a gynecologist.    REVIEW OF SYSTEMS no chest pain or shortness of breath no hemoptysis no black or bloody stool or emesis. All other systems negative    PAST MEDICAL HISTORY  Past Medical History:   Diagnosis Date   • Anxiety    • Bowel habit changes 01/10/2018    constipation   • Cancer (CMS-HCC) 2017    Kidney   • Depression 03/07/2018    Depression/anxiety.   • Head ache    • Heart burn    • Hyperlipidemia    • Hypertension    • Snoring 03/07/2018    No  "sleep study.       FAMILY HISTORY  No family history on file.    SOCIAL HISTORY  Social History     Social History   • Marital status:      Spouse name: N/A   • Number of children: N/A   • Years of education: N/A     Social History Main Topics   • Smoking status: Never Smoker   • Smokeless tobacco: Never Used   • Alcohol use No   • Drug use: No   • Sexual activity: Not on file     Other Topics Concern   • Not on file     Social History Narrative    ** Merged History Encounter **            SURGICAL HISTORY  Past Surgical History:   Procedure Laterality Date   • NEPHRECTOMY ROBOTIC Right 1/18/2018    Procedure: NEPHRECTOMY ROBOTIC- RADICAL;  Surgeon: Isaac Hicks M.D.;  Location: SURGERY [facility];  Service: Urology   • BLADDER SLING FEMALE  9/30/08    Performed by SUNNY MALDONADO at SURGERY SAME DAY [facility]   • OTHER ORTHOPEDIC SURGERY  2008    hardware left ankle       CURRENT MEDICATIONS  Home Medications     Reviewed by Tika Garcia, Pharmacy Intern (Pharmacy Intern) on 04/12/18 at 1509  Med List Status: Complete   Medication Last Dose Status   acetaminophen (TYLENOL) 500 MG Tab 4/12/2018 Active   amlodipine (NORVASC) 5 MG Tab 4/11/2018 Active                ALLERGIES  Allergies   Allergen Reactions   • Food      Eggs to eat causes stomach ache and rash       PHYSICAL EXAM  VITAL SIGNS: BP (!) 95/46   Pulse 77   Temp 36.4 °C (97.5 °F)   Resp 18   Ht 1.676 m (5' 6\")   Wt 103.4 kg (228 lb)   SpO2 100%   BMI 36.80 kg/m²     Oxygen saturation is interpreted as adequate  Constitutional: Awake and very tired appearing  HENT: Mucous membranes are dry  Eyes: No erythema or discharge or jaundice  Neck: Trachea midline no JVD  Cardiovascular: Regular rate and rhythm  Lungs: Clear and equal bilaterally with no apparent difficulty breathing  Abdomen/Back: Soft nontender nondistended no peritoneal findings. A pelvic examination was done with a nurse chaperone and I evacuated a large " blood clot from the vaginal vault and there did not appear to be any active uterine bleeding after the clot was removed.  Skin: Warm and dry with a yellowish discoloration most notable in the lower extremities  Musculoskeletal: No leg edema or calf tenderness  Neurologic: Awake verbal moving all extremities    CHART REVIEW  Recent chart notes were reviewed as noted above in history present illness. Also the patient had an ultrasound on April 4, 2018 showing mild thickening of the endometrial stripe as well as nabothian cyst and small left uterine cyst.    Laboratory  Today CBC shows white blood cell count of 3.9 at very low hemoglobin of 6.5 the platelet count is slightly low at 155 basic metabolic panel shows a low potassium of 3.0 the creatinine is slightly elevated at 1.68 the blood sugar is elevated at 178 total bilirubin is normal at 0.5 alk phos is 145 principal test is negative    EKG interpretation  12-lead EKG shows sinus rhythm 89 bpm there is no pathologic ST elevation or depression      MEDICAL DECISION MAKING and DISPOSITION  In the emergency department an IV was established and the patient was placed on the cardiac monitor she was given intravenous fluids and I have ordered 2 units of packed red blood cells for transfusion. Also while in the ER she started having a cough and her  says that this cough has been present for the last month and it seems to come and go. The patient was given intravenous Decadron and oral Tessalon. I reviewed the case with Dr. Reid the patient's oncologist and with Dr. Carroll will provide gynecologic consultation and with the [facility] internal medicine residents and the patient is admitted to the resident service for further evaluation and treatment    IMPRESSION  1. Severe anemia requiring transfusion  2. Abnormal prolonged Uterine bleeding  3. Metastatic kidney cancer  4. Critical care time with this patient is 35 minutes         Electronically signed by: Reynaldo POLLARD  Herlinda, 4/12/2018 6:12 PM

## 2018-04-13 NOTE — PROGRESS NOTES
Notified MD Chapman of patients Hgb result of 6.4 after one unit of PRBCs. Per Dr. Chapman, okay to transfuse one more unit of PRBCs followed by a Q4 Hemoglobin recheck. Patient to remain NPO as active order persists. Patient and family educated.
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
## 2018-04-13 NOTE — PROGRESS NOTES
Received bedside report from day shift RN. Tommy [email or web address], Kiswahili speaking only.  at bedside assisting with translation. Up x1. Patient complains of dizziness. Bilateral PIV patent, flushing. Patient denies pain, nausea, vomiting. Will continue to monitor. Continues to saturate bloody pads. Clots noted in last pad when patient transferred from commode. Transfusing PRN. Patient received one unit of PRBCs on oncology unit. POC discussed with patient. Calls appropriately for assistance. Call light within reach. Bed in lowest and locked position. Q2 rounding in place.

## 2018-04-13 NOTE — PROGRESS NOTES
Svetlana from lab called regarding critical value of WBC 2.2 to Dr. Chapman. Dr. Chapman verbalized understanding, patient placed on neutropenic precautions. No other orders received.

## 2018-04-13 NOTE — H&P
CHIEF COMPLAINT:  Heavy vaginal bleeding for 20 days.    HISTORY OF PRESENT ILLNESS:  This is a 47-year-old  7, para 5, Latin   American female who has been recently diagnosed with right renal cell cancer   in 2018.  She has undergone right nephrectomy and was placed on   Sutent 50 mg 1 p.o. daily as well as Xarelto and she was supposed to be on   this for 3 months.  Patient usually has menstrual cycles every 28 days and   they last 5 days.  She does not use anything for contraception.  Patient,   however, 20 days ago started having bleeding every single day.  She was seen   in the emergency department on the  of this month and found to have   hemoglobin of 8.9 and her oncologist then transfused 2 units of packed red   blood cells and last Friday also stopped the Xarelto and stopped the Sutent in   order to help decrease the vaginal bleeding.  The patient has continued to   have vaginal bleeding and states that she has been going through about a pad   an hour.  The patient came to the emergency room today and when she came into   the emergency room and the bleeding had decreased.  She has been here for   about 5 hours and used 1 chux since then, there has been no more bleeding.    On inspection of her perineum, the perineum appears dry.  When she arrived at   1 this afternoon, her hemoglobin was 6.5 and 2 units of blood were started.  I   was asked to come and evaluate the patient.  I have evaluated the patient.    Right now, she is not actively bleeding.  A pelvic ultrasound was just   performed.  The ultrasound shows that the uterus measures 4.55x7.68x5.12 cm.    The myometrium is within normal limits.  The endometrial stripe measures 0.45   cm atrophic.  The right ovary is normal and the left ovary is normal.    PAST MEDICAL HISTORY:  1.  Hypertension.  2.  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    PAST SURGICAL HISTORY:  Right nephrectomy 2018, previous laparoscopic    cholecystectomy, previous left toe surgery, previous right hand surgery.    MEDICATIONS:  Patient was on Sutent 50 mg 1 p.o. daily.  She has been off   since last Friday.  She was also on Xarelto 1 tablet every day, unknown dosage   also off since last Friday.  She is on Norvasc 5 mg 1 p.o. daily.    ALLERGIES:  No known drug allergies.    OBSTETRICAL HISTORY:  She is a  7, para 5.  She has had 5 previous   normal spontaneous vaginal deliveries, all at term, had 2 SABs in the first   trimester.    GYNECOLOGIC HISTORY:  Patient started menstruating at age 15, has menstrual   cycle every 28 days, last about 5 days, has not had a Pap smear in a number of   years, but they have always been normal.  Currently, she has been bleeding   for 20 days.    SOCIAL HISTORY:  Patient is .  Denies tobacco, alcohol or drug use.    PHYSICAL EXAMINATION:  VITAL SIGNS:  Patient's blood pressure 95/46, heart rate is 47, temperature   97.5.  GENERAL:  Pleasant female, pale appearing, in no acute distress.  ABDOMEN:  Soft, obese, nontender, nondistended.  GENITOURINARY:  Deferred, but her pad currently is dry.    LABORATORY DATA:  An ultrasound performed at the bedside while I was there   showed that the uterus is normal.  Normal adnexa and endometrial stripe of   0.45 cm.  Her current hemoglobin was 6.5.    DISCUSSION:  I discussed with the patient that at this time, there is no need   for D and C since she does not have a thickened endometrium.  She had atrophic   endometrium and therefore the treatment would be hormonal, the options would   be IV Premarin to stop the bleeding versus oral Megace which is progesterone   versus birth control pills versus a Mirena IUD.  Because of her renal cancer   and her risk of thrombosis, I would be more incline to put a Mirena IUD in   after discussing with the patient and her  the risks, benefits,   indications and alternatives to all these hormonal treatment.  They agree to    have the Mirena IUD placed.  If the patient is admitted, she will follow up   with me for Mirena IUD either tomorrow, Friday or Monday morning.  I have   given them my information and they will follow up with me.  I also called Dr. Fuentes, her oncology/hematologist and I discussed with him the different   options and he agreed that the Mirena IUD would be the best solution for her.    The patient at this time is not actively bleeding.  If the bleeding restarts   tonight before she is discharged home while she is getting her blood   transfusion then I would just IV Premarin 4 doses and then put a Mirena IUD   tomorrow morning.    ASSESSMENT AND PLAN:  A 47-year-old  7, para 5.  1.  Dysfunctional uterine bleeding causing anemia, likely from now atrophy at   this time, the options are medical treatment with hormones either IV Premarin   birth control pills, oral Megace or Mirena IUD.  The bleeding has some right   now diminished and she is not actively bleeding; therefore, I feel comfortable   for the patient to go home and for her to see me tomorrow morning in my   office and I will place the Mirena IUD in the office as long as the patient is   stable to be discharged home.  At this time, the bleeding is minimal.  Her   Pad is dryl with no active bleeding.  The ultrasound was also normal, no need   for surgical intervention.  2.  Iron deficiency anemia secondary to acute blood loss.  The patient at this   time is getting 2 units of packed red blood cells per the ER physician.  3.  Right renal cell cancer status post nephrectomy.  Patient was on Sutent   and on Xarelto, but this has been discontinued.       ____________________________________     MD GENEVIEVE TORREZH / NTS    DD:  2018 19:53:52  DT:  2018 22:08:34    D#:  [id number]  Job#:  [id number]    cc: Ramirez Fuentes III

## 2018-04-13 NOTE — ASSESSMENT & PLAN NOTE
- likely dysfunctional uterine bleeding based on history and exams, may be component of GI as well as FOBT+, cannot rule out other contributing sources  - significant vaginal bleeding requiring multiple transfusions since last week  - received 3u PRBCs this course, may need additional based on Hg recheck  - previous low iron studies  - needs inpatient level care for assumed continued blood loss and/or coagulopathy requiring transfusions and potential need for escalation of care to critical level  - complications include life-threatening hemorrhage, need for massive transfusion protocol, end-organ failure, etc.  Plan  - trend Hg, transfuse for Hg <7  - will consider iron infusion  - IVF  - monitor and correct electrolytes  - pending further workup in the morning  - f/u Heme-Onc recs, pending

## 2018-04-13 NOTE — ASSESSMENT & PLAN NOTE
"- history of Xarelto and sunitinib (hemorrhage as side effect) with possible cancer-related coagulopathy predisposed to vignal bleeding due to uterine atrophy  - stable per EDP and Ob/Gyn's exams  Plan  - per \"Blood loss anemia\"  "

## 2018-04-13 NOTE — PROGRESS NOTES
Pt AAOx4. Ambulates independently. Pt discharge in stable condition with all belongings via transport with . Reviewed discharge instructions, medications, and follow up appointments. All questions answered.

## 2018-04-13 NOTE — ASSESSMENT & PLAN NOTE
- on rectal exam, no gross blood or masses  Plan  - GI input, outpatient vs inpatient depending on evolving stability and clinical exam

## 2018-04-13 NOTE — CARE PLAN
Problem: Communication  Goal: The ability to communicate needs accurately and effectively will improve  AxO x4. [place] Islander speaking only.  at bedside assisting with translating. Calls appropriately for assistance. Call light within reach. Q2 rounding in place.     Problem: Safety  Goal: Will remain free from injury  Patient up x1. C/O dizziness. Calls appropriately for assistance. Call light within reach. Bed in lowest/locked position.  at bedside.

## 2018-04-14 ENCOUNTER — APPOINTMENT (OUTPATIENT)
Dept: RADIOLOGY | Facility: MEDICAL CENTER | Age: 48
End: 2018-04-14
Attending: EMERGENCY MEDICINE
Payer: COMMERCIAL

## 2018-04-14 ENCOUNTER — HOSPITAL ENCOUNTER (EMERGENCY)
Facility: MEDICAL CENTER | Age: 48
End: 2018-04-14
Attending: EMERGENCY MEDICINE
Payer: COMMERCIAL

## 2018-04-14 ENCOUNTER — PATIENT OUTREACH (OUTPATIENT)
Dept: HEALTH INFORMATION MANAGEMENT | Facility: OTHER | Age: 48
End: 2018-04-14

## 2018-04-14 VITALS
SYSTOLIC BLOOD PRESSURE: 102 MMHG | BODY MASS INDEX: 42.11 KG/M2 | HEIGHT: 63 IN | DIASTOLIC BLOOD PRESSURE: 66 MMHG | TEMPERATURE: 98 F | WEIGHT: 237.66 LBS | OXYGEN SATURATION: 99 % | HEART RATE: 79 BPM | RESPIRATION RATE: 17 BRPM

## 2018-04-14 DIAGNOSIS — R10.11 RIGHT UPPER QUADRANT ABDOMINAL PAIN: ICD-10-CM

## 2018-04-14 DIAGNOSIS — C79.9 METASTATIC CANCER (HCC): ICD-10-CM

## 2018-04-14 LAB
ALBUMIN SERPL BCP-MCNC: 2.7 G/DL (ref 3.2–4.9)
ALBUMIN/GLOB SERPL: 0.9 G/DL
ALP SERPL-CCNC: 117 U/L (ref 30–99)
ALT SERPL-CCNC: 23 U/L (ref 2–50)
ANION GAP SERPL CALC-SCNC: 6 MMOL/L (ref 0–11.9)
APPEARANCE UR: CLEAR
AST SERPL-CCNC: 28 U/L (ref 12–45)
BACTERIA #/AREA URNS HPF: NEGATIVE /HPF
BASOPHILS # BLD AUTO: 0.2 % (ref 0–1.8)
BASOPHILS # BLD: 0.01 K/UL (ref 0–0.12)
BILIRUB SERPL-MCNC: 0.3 MG/DL (ref 0.1–1.5)
BILIRUB UR QL STRIP.AUTO: NEGATIVE
BUN SERPL-MCNC: 19 MG/DL (ref 8–22)
CALCIUM SERPL-MCNC: 7.9 MG/DL (ref 8.5–10.5)
CHLORIDE SERPL-SCNC: 108 MMOL/L (ref 96–112)
CO2 SERPL-SCNC: 22 MMOL/L (ref 20–33)
COLOR UR: YELLOW
CREAT SERPL-MCNC: 1.17 MG/DL (ref 0.5–1.4)
EOSINOPHIL # BLD AUTO: 0.03 K/UL (ref 0–0.51)
EOSINOPHIL NFR BLD: 0.7 % (ref 0–6.9)
EPI CELLS #/AREA URNS HPF: NEGATIVE /HPF
ERYTHROCYTE [DISTWIDTH] IN BLOOD BY AUTOMATED COUNT: 51.8 FL (ref 35.9–50)
GLOBULIN SER CALC-MCNC: 3 G/DL (ref 1.9–3.5)
GLUCOSE SERPL-MCNC: 210 MG/DL (ref 65–99)
GLUCOSE UR STRIP.AUTO-MCNC: NEGATIVE MG/DL
HCT VFR BLD AUTO: 22.1 % (ref 37–47)
HGB BLD-MCNC: 7.2 G/DL (ref 12–16)
HYALINE CASTS #/AREA URNS LPF: NORMAL /LPF
IMM GRANULOCYTES # BLD AUTO: 0.02 K/UL (ref 0–0.11)
IMM GRANULOCYTES NFR BLD AUTO: 0.5 % (ref 0–0.9)
KETONES UR STRIP.AUTO-MCNC: NEGATIVE MG/DL
LACTATE BLD-SCNC: 1.9 MMOL/L (ref 0.5–2)
LEUKOCYTE ESTERASE UR QL STRIP.AUTO: NEGATIVE
LYMPHOCYTES # BLD AUTO: 1.46 K/UL (ref 1–4.8)
LYMPHOCYTES NFR BLD: 34 % (ref 22–41)
MCH RBC QN AUTO: 28.8 PG (ref 27–33)
MCHC RBC AUTO-ENTMCNC: 32.6 G/DL (ref 33.6–35)
MCV RBC AUTO: 88.4 FL (ref 81.4–97.8)
MICRO URNS: ABNORMAL
MONOCYTES # BLD AUTO: 0.16 K/UL (ref 0–0.85)
MONOCYTES NFR BLD AUTO: 3.7 % (ref 0–13.4)
NEUTROPHILS # BLD AUTO: 2.62 K/UL (ref 2–7.15)
NEUTROPHILS NFR BLD: 60.9 % (ref 44–72)
NITRITE UR QL STRIP.AUTO: NEGATIVE
NRBC # BLD AUTO: 0 K/UL
NRBC BLD-RTO: 0 /100 WBC
PH UR STRIP.AUTO: 5 [PH]
PLATELET # BLD AUTO: 139 K/UL (ref 164–446)
PMV BLD AUTO: 10.1 FL (ref 9–12.9)
POTASSIUM SERPL-SCNC: 3.3 MMOL/L (ref 3.6–5.5)
PROT SERPL-MCNC: 5.7 G/DL (ref 6–8.2)
PROT UR QL STRIP: NEGATIVE MG/DL
RBC # BLD AUTO: 2.5 M/UL (ref 4.2–5.4)
RBC # URNS HPF: NORMAL /HPF
RBC UR QL AUTO: ABNORMAL
SODIUM SERPL-SCNC: 136 MMOL/L (ref 135–145)
SP GR UR REFRACTOMETRY: >1.045
UROBILINOGEN UR STRIP.AUTO-MCNC: 0.2 MG/DL
WBC # BLD AUTO: 4.3 K/UL (ref 4.8–10.8)
WBC #/AREA URNS HPF: NORMAL /HPF

## 2018-04-14 PROCEDURE — 700117 HCHG RX CONTRAST REV CODE 255: Performed by: EMERGENCY MEDICINE

## 2018-04-14 PROCEDURE — 74177 CT ABD & PELVIS W/CONTRAST: CPT

## 2018-04-14 PROCEDURE — 96374 THER/PROPH/DIAG INJ IV PUSH: CPT

## 2018-04-14 PROCEDURE — 80053 COMPREHEN METABOLIC PANEL: CPT

## 2018-04-14 PROCEDURE — 87077 CULTURE AEROBIC IDENTIFY: CPT

## 2018-04-14 PROCEDURE — 85025 COMPLETE CBC W/AUTO DIFF WBC: CPT

## 2018-04-14 PROCEDURE — 83605 ASSAY OF LACTIC ACID: CPT

## 2018-04-14 PROCEDURE — 87086 URINE CULTURE/COLONY COUNT: CPT

## 2018-04-14 PROCEDURE — 87040 BLOOD CULTURE FOR BACTERIA: CPT

## 2018-04-14 PROCEDURE — 81001 URINALYSIS AUTO W/SCOPE: CPT

## 2018-04-14 PROCEDURE — 71275 CT ANGIOGRAPHY CHEST: CPT

## 2018-04-14 PROCEDURE — 71045 X-RAY EXAM CHEST 1 VIEW: CPT

## 2018-04-14 PROCEDURE — 96375 TX/PRO/DX INJ NEW DRUG ADDON: CPT

## 2018-04-14 PROCEDURE — 99284 EMERGENCY DEPT VISIT MOD MDM: CPT

## 2018-04-14 PROCEDURE — 700111 HCHG RX REV CODE 636 W/ 250 OVERRIDE (IP): Performed by: EMERGENCY MEDICINE

## 2018-04-14 RX ORDER — ONDANSETRON 2 MG/ML
4 INJECTION INTRAMUSCULAR; INTRAVENOUS ONCE
Status: COMPLETED | OUTPATIENT
Start: 2018-04-14 | End: 2018-04-14

## 2018-04-14 RX ORDER — MORPHINE SULFATE 4 MG/ML
4 INJECTION, SOLUTION INTRAMUSCULAR; INTRAVENOUS ONCE
Status: COMPLETED | OUTPATIENT
Start: 2018-04-14 | End: 2018-04-14

## 2018-04-14 RX ORDER — ONDANSETRON 4 MG/1
4 TABLET, ORALLY DISINTEGRATING ORAL EVERY 8 HOURS PRN
Qty: 10 TAB | Refills: 0 | Status: SHIPPED | OUTPATIENT
Start: 2018-04-14 | End: 2018-04-20

## 2018-04-14 RX ORDER — MORPHINE SULFATE 15 MG/1
15 TABLET ORAL EVERY 4 HOURS PRN
Qty: 30 TAB | Refills: 0 | Status: ON HOLD | OUTPATIENT
Start: 2018-04-14 | End: 2018-04-18

## 2018-04-14 RX ADMIN — MORPHINE SULFATE 4 MG: 4 INJECTION INTRAVENOUS at 06:49

## 2018-04-14 RX ADMIN — IOHEXOL 75 ML: 350 INJECTION, SOLUTION INTRAVENOUS at 08:20

## 2018-04-14 RX ADMIN — ONDANSETRON 4 MG: 2 INJECTION, SOLUTION INTRAMUSCULAR; INTRAVENOUS at 06:49

## 2018-04-14 ASSESSMENT — PAIN SCALES - GENERAL
PAINLEVEL_OUTOF10: 2
PAINLEVEL_OUTOF10: 10
PAINLEVEL_OUTOF10: 3
PAINLEVEL_OUTOF10: 3

## 2018-04-14 NOTE — ED TRIAGE NOTES
"Eva Castellano  47 y.o. female  Chief Complaint   Patient presents with   • Pain     Pt reports generalized 10/10 right sided pain from \"the waist all the way to the top.\"       Pt amb to triage via wheelchair for above complaint. Pt up to scale with one person assist.   Pt d/c'd from this facility on 4/13 with a dx of \"blood loss anemia.\" Per family pt received 2 units of RBC  Pt is alert and oriented, speaking in full sentences, follows commands and responds appropriately to questions. NAD. Resp are even and unlabored.  Pt Sepsis score of 3. Protocol ordered. Charge RN aware. Pt to red 2 via wheelchair  "

## 2018-04-14 NOTE — ED NOTES
The Medication Reconciliation process has been completed by interviewing the patient's , pt received three new prescriptions which were picked up and she was going to start them this am but has not, I left them on the profile.     Allergies have been reviewed  Antibiotic use in 30 days - none     Home Pharmacy:  [facility]

## 2018-04-14 NOTE — ED NOTES
..Pt verbalizes understanding of dc instructions. Written instructions given in both english and Montenegrin per request. Rx given. Pt states will not drink/drive on rx of narcotics provided. Pt states all questions have been answered and they feel comfortable with dc instructions provided. Pt states has all personal belonging. Pt to opal w/o incident. Pt requested scan results to take with to [facility] and a cd was provided for them

## 2018-04-14 NOTE — ED PROVIDER NOTES
"ED Provider Note    CHIEF COMPLAINT  Chief Complaint   Patient presents with   • Pain     Pt reports generalized 10/10 right sided pain from \"the waist all the way to the top.\"       HPI  Neos Opal Castellano is a 47 y.o. female who presents for evaluation of pain. The patient was just discharged from the hospital yesterday. In January of this year she was diagnosed as having renal cell carcinoma with metastases to the lungs. She had a nephrectomy. 2 days ago she presented to the hospital for vaginal bleeding and was admitted. She required a total of 3 units of packed red blood cells. She was seen by a GYN. The patient had been on Xarelto and that was held. She comes in now stating that she felt okay when she went to bed last night and in the middle of the night she awoke with right sided pain. She points to her right upper quadrant and right lower chest wall region as her site of discomfort. She states that extends behind her lung on the right. She's had no fevers. She's had no cough or sputum production. She states she has felt short of breath. She denies vomiting or diarrhea. Nothing seems to make the pain better or worse.    REVIEW OF SYSTEMS  See HPI for further details. All other systems are negative.     PAST MEDICAL HISTORY  Past Medical History:   Diagnosis Date   • Anxiety    • Bowel habit changes 01/10/2018    constipation   • Cancer (CMS-HCC) 2017    Kidney   • Depression 03/07/2018    Depression/anxiety.   • Head ache    • Heart burn    • Hyperlipidemia    • Hypertension    • Snoring 03/07/2018    No sleep study.       FAMILY HISTORY  No family history on file.    SOCIAL HISTORY  Social History     Social History   • Marital status:      Spouse name: N/A   • Number of children: N/A   • Years of education: N/A     Social History Main Topics   • Smoking status: Never Smoker   • Smokeless tobacco: Never Used   • Alcohol use No   • Drug use: No   • Sexual activity: Not on file     Other " "Topics Concern   • Not on file     Social History Narrative    ** Merged History Encounter **            SURGICAL HISTORY  Past Surgical History:   Procedure Laterality Date   • NEPHRECTOMY ROBOTIC Right 1/18/2018    Procedure: NEPHRECTOMY ROBOTIC- RADICAL;  Surgeon: Isaac Hicks M.D.;  Location: SURGERY [facility];  Service: Urology   • BLADDER SLING FEMALE  9/30/08    Performed by SUNNY MALDONADO at SURGERY SAME DAY [facility] ORS   • OTHER ORTHOPEDIC SURGERY  2008    hardware left ankle       CURRENT MEDICATIONS  Home Medications    **Home medications have not yet been reviewed for this encounter**         ALLERGIES  Allergies   Allergen Reactions   • Food      Eggs to eat causes stomach ache and rash       PHYSICAL EXAM  VITAL SIGNS: /77   Pulse 100   Temp 36.5 °C (97.7 °F)   Resp 18   Ht 1.6 m (5' 3\")   Wt 107.8 kg (237 lb 10.5 oz)   SpO2 99%   BMI 42.10 kg/m²     Constitutional: Well developed, Well nourished, Non-toxic appearance.   HENT: Normocephalic, Atraumatic.   Eyes:  EOMI, Conjunctiva normal, No discharge.   Cardiovascular: Slight tachycardia, Normal rhythm, No murmurs, No rubs, No gallops.   Thorax & Lungs: Lungs clear to auscultation bilaterally without wheezes, rales or rhonchi. No respiratory distress. No chest tenderness.   Abdomen: Bowel sounds normal, Soft, slight right upper quadrant tenderness, No masses, No pulsatile masses. No guarding and no rebound.  Skin: Warm, Dry.   Extremities:  No edema, No cyanosis. No calf tenderness or swelling.  Musculoskeletal: Good range of motion in all major joints.  Neurologic: Awake alert , Cranial nerves II through XII are intact. Normal motor function, No focal deficits noted.     RADIOLOGY/PROCEDURES  CT-ABDOMEN-PELVIS WITH   Final Result      Multiple low-attenuation lesions in the liver most consistent with metastatic disease.      New 17 mm lytic lesion in the left side of the L5  vertebral body suspicious for metastatic " disease.      Status post right nephrectomy without evidence of mass in the right renal fossa.      Left adrenal gland mass again noted suspicious for metastatic disease.      No evidence of acute abdominal or pelvic pathology.      CT-CTA CHEST PULMONARY ARTERY W/ RECONS   Final Result      No evidence of pulmonary embolus.      Multiple pulmonary nodules and thoracic lymphadenopathy again noted consistent with metastatic disease.      Bibasilar atelectasis.      Multiple low-attenuation lesions in liver not appreciated on previous noncontrast CT are most consistent with metastatic disease.      Left adrenal gland nodule consistent with metastatic disease again noted.            DX-CHEST-PORTABLE (1 VIEW)   Final Result      1.  There is hypoinflation with bibasilar atelectasis.            COURSE & MEDICAL DECISION MAKING  Pertinent Labs & Imaging studies reviewed. (See chart for details)  This is a 47-year-old female who is here for evaluation of right upper quadrant and right sided lateral chest discomfort. The patient was recently diagnosed with metastatic renal cancer. She's had a right nephrectomy. She was just discharged from the hospital yesterday after being admitted for dysfunctional uterine bleeding and anemia. She received multiple transfusions. She comes in now complaining of pain which started last evening. She is on no pain medicine at home whatsoever. She is scheduled to be seen in [place] for her cancer next week. She's had no fevers. She's had nausea but no vomiting. On exam she has right upper quadrant tenderness with no obvious masses. Based on her presentation and history of metastatic cancer was concerned about the possibility of pulmonary embolism. I also thought it important to image her abdomen and therefore a CT scan of the abdomen with IV contrast is obtained as well as a CTA of pulmonary arteries. The chest CT shows no evidence of ulnar embolism or pneumonia or pneumothorax. She does  have multiple metastases which were previously identified. Abdominal CT shows previous identified findings but also new findings of low-attenuation lesions in the liver and a lytic lesion on L5. Laboratories included urinalysis which is complaining negative. Chemistries are notable for blood sugar of 210. Testing was slightly low at 3.3. Liver function studies are normal with the exception of an outlet phosphatase of 117. CBC shows a white count of 4.3 with a hemoglobin of 7 which is unchanged from discharge and she seems to have stabilized as far as her dysfunctional uterine bleeding is concerned. Platelet count is slightly low at 139. Differential is normal. Patient is treated with Dilaudid and Zofran here in the emergency department. Upon repeat evaluation her symptoms are greatly improved. I discussed the results of all the studies with the patient and her family. I explained to her that an argument could be made to admit her to the hospital however she would prefer to go home. I think it's fine in this case that she go home since she currently has no pain medication I will provide her a prescription for morphine which has worked for her in the past. I reviewed her prescription monitoring report. I will also provide her a prescription for Zofran. I suspect her discomfort today is related to her metastatic disease to her liver. I see no evidence of skin lesions that would suggest shingles and her surgical incision appears intact and with no evidence of infection. She is instructed to return to the emergency department immediately if she is not well. She will otherwise follow up with her doctors as scheduled. She is given a discharge instruction sheet on metastatic cancer.    FINAL IMPRESSION  1. Right upper quadrant abdominal pain secondary to metastatic lesions to the liver  2. Metastatic renal cell carcinoma  3. New lytic lesion to L5  4. Chronic anemia secondary to dysfunctional uterine bleeding which appears  to have stabilized  5. Thrombocytopenia  6. Patient required 35 minutes of critical care time         Electronically signed by: Esdras Higgins, 4/14/2018 6:45 AM

## 2018-04-15 ENCOUNTER — PATIENT OUTREACH (OUTPATIENT)
Dept: HEALTH INFORMATION MANAGEMENT | Facility: OTHER | Age: 48
End: 2018-04-15

## 2018-04-15 NOTE — PROGRESS NOTES
Placed discharge outreach phone call to patient s/p ER discharge 4/14/18.  Left voicemail providing my contact information and instructions to call with any questions or concerns.

## 2018-04-16 LAB
BACTERIA UR CULT: ABNORMAL
BACTERIA UR CULT: ABNORMAL
SIGNIFICANT IND 70042: ABNORMAL
SITE SITE: ABNORMAL
SOURCE SOURCE: ABNORMAL

## 2018-04-18 ENCOUNTER — HOSPITAL ENCOUNTER (OUTPATIENT)
Facility: MEDICAL CENTER | Age: 48
End: 2018-04-18
Attending: INTERNAL MEDICINE | Admitting: INTERNAL MEDICINE
Payer: COMMERCIAL

## 2018-04-18 ENCOUNTER — HOSPITAL ENCOUNTER (OUTPATIENT)
Facility: MEDICAL CENTER | Age: 48
End: 2018-04-18
Payer: COMMERCIAL

## 2018-04-18 ENCOUNTER — HOSPITAL ENCOUNTER (OUTPATIENT)
Facility: MEDICAL CENTER | Age: 48
End: 2018-04-18
Attending: INTERNAL MEDICINE
Payer: COMMERCIAL

## 2018-04-18 ENCOUNTER — HOSPITAL ENCOUNTER (OUTPATIENT)
Dept: LAB | Facility: MEDICAL CENTER | Age: 48
End: 2018-04-18
Attending: INTERNAL MEDICINE
Payer: COMMERCIAL

## 2018-04-18 VITALS
BODY MASS INDEX: 41.6 KG/M2 | HEART RATE: 77 BPM | SYSTOLIC BLOOD PRESSURE: 142 MMHG | RESPIRATION RATE: 18 BRPM | DIASTOLIC BLOOD PRESSURE: 72 MMHG | OXYGEN SATURATION: 94 % | TEMPERATURE: 97.2 F | HEIGHT: 63 IN | WEIGHT: 234.79 LBS

## 2018-04-18 LAB
ABO GROUP BLD: NORMAL
BARCODED ABORH UBTYP: 5100
BARCODED ABORH UBTYP: 5100
BARCODED PRD CODE UBPRD: NORMAL
BARCODED PRD CODE UBPRD: NORMAL
BARCODED UNIT NUM UBUNT: NORMAL
BARCODED UNIT NUM UBUNT: NORMAL
BLD GP AB SCN SERPL QL: NORMAL
COMPONENT R 8504R: NORMAL
COMPONENT R 8504R: NORMAL
PRODUCT TYPE UPROD: NORMAL
PRODUCT TYPE UPROD: NORMAL
RH BLD: NORMAL
UNIT STATUS USTAT: NORMAL
UNIT STATUS USTAT: NORMAL

## 2018-04-18 PROCEDURE — A9270 NON-COVERED ITEM OR SERVICE: HCPCS | Performed by: INTERNAL MEDICINE

## 2018-04-18 PROCEDURE — 700102 HCHG RX REV CODE 250 W/ 637 OVERRIDE(OP): Performed by: INTERNAL MEDICINE

## 2018-04-18 PROCEDURE — P9016 RBC LEUKOCYTES REDUCED: HCPCS

## 2018-04-18 PROCEDURE — 86644 CMV ANTIBODY: CPT

## 2018-04-18 PROCEDURE — 86923 COMPATIBILITY TEST ELECTRIC: CPT | Mod: 91

## 2018-04-18 PROCEDURE — G0378 HOSPITAL OBSERVATION PER HR: HCPCS

## 2018-04-18 PROCEDURE — 36415 COLL VENOUS BLD VENIPUNCTURE: CPT

## 2018-04-18 PROCEDURE — 80053 COMPREHEN METABOLIC PANEL: CPT

## 2018-04-18 PROCEDURE — 86901 BLOOD TYPING SEROLOGIC RH(D): CPT

## 2018-04-18 PROCEDURE — 36430 TRANSFUSION BLD/BLD COMPNT: CPT

## 2018-04-18 PROCEDURE — 86850 RBC ANTIBODY SCREEN: CPT

## 2018-04-18 PROCEDURE — 86900 BLOOD TYPING SEROLOGIC ABO: CPT

## 2018-04-18 RX ORDER — AMLODIPINE BESYLATE 5 MG/1
5 TABLET ORAL NIGHTLY
Status: DISCONTINUED | OUTPATIENT
Start: 2018-04-18 | End: 2018-04-19 | Stop reason: HOSPADM

## 2018-04-18 RX ORDER — ACETAMINOPHEN 325 MG/1
650 TABLET ORAL ONCE
Status: COMPLETED | OUTPATIENT
Start: 2018-04-18 | End: 2018-04-18

## 2018-04-18 RX ORDER — DIPHENHYDRAMINE HCL 25 MG
25 TABLET ORAL ONCE
Status: COMPLETED | OUTPATIENT
Start: 2018-04-18 | End: 2018-04-18

## 2018-04-18 RX ADMIN — ACETAMINOPHEN 650 MG: 325 TABLET, FILM COATED ORAL at 18:52

## 2018-04-18 RX ADMIN — AMLODIPINE BESYLATE 5 MG: 5 TABLET ORAL at 22:24

## 2018-04-18 RX ADMIN — DIPHENHYDRAMINE HCL 25 MG: 25 TABLET ORAL at 18:52

## 2018-04-18 ASSESSMENT — PAIN SCALES - GENERAL
PAINLEVEL_OUTOF10: 0

## 2018-04-18 ASSESSMENT — PATIENT HEALTH QUESTIONNAIRE - PHQ9
2. FEELING DOWN, DEPRESSED, IRRITABLE, OR HOPELESS: NOT AT ALL
1. LITTLE INTEREST OR PLEASURE IN DOING THINGS: NOT AT ALL
SUM OF ALL RESPONSES TO PHQ9 QUESTIONS 1 AND 2: 0

## 2018-04-18 ASSESSMENT — LIFESTYLE VARIABLES
EVER_SMOKED: NEVER
EVER_SMOKED: NEVER
ALCOHOL_USE: NO
DO YOU DRINK ALCOHOL: NO

## 2018-04-18 NOTE — ED NOTES
ED Positive Culture Follow-up/Notification Note:    Date: 04/18/2018     Patient seen in the ED on 4/14/2018 for right upper quadrant pain. Patient was discharged from the hospital 2 days prior to arrival. Was recently diagnosed with renal cell carcinoma with metastasis to her lungs. No other symptoms endorsed.  1. Right upper quadrant abdominal pain    2. Metastatic cancer (CMS-HCC)       Discharge Medication List as of 4/14/2018 10:03 AM      START taking these medications    Details   morphine (MS IR) 15 MG tablet Take 1 Tab by mouth every four hours as needed for Severe Pain for up to 5 days., Disp-30 Tab, R-0, Print Rx Paper, For 5 days      ondansetron (ZOFRAN ODT) 4 MG TABLET DISPERSIBLE Take 1 Tab by mouth every 8 hours as needed., Disp-10 Tab, R-0, Print Rx Paper             Allergies: Food     Vitals:    04/14/18 0633 04/14/18 0700 04/14/18 0915 04/14/18 0955   BP:    102/66   Pulse: 82 85 69 79   Resp:    17   Temp:    36.7 °C (98 °F)   SpO2: 100% 99% 100% 99%   Weight:       Height:           Final cultures:   Results     Procedure Component Value Units Date/Time    URINE CULTURE(NEW) [[id number]]  (Abnormal) Collected:  04/14/18 0941    Order Status:  Completed Specimen:  Urine Updated:  04/16/18 0659     Significant Indicator POS (POS)     Source UR     Site --     Urine Culture Mixed skin efrem >100,000 cfu/mL (A)      Streptococcus agalactiae (Group B)  >100,000 cfu/mL   (A)    Narrative:       Indication for culture:->Emergency Room Patient    BLOOD CULTURE [[id number]] Collected:  04/14/18 0654    Order Status:  Completed Specimen:  Blood from Peripheral Updated:  04/15/18 0639     Significant Indicator NEG     Source BLD     Site PERIPHERAL     Blood Culture No Growth    Note: Blood cultures are incubated for 5 days and  are monitored continuously.Positive blood cultures  are called to the RN and reported as soon as  they are identified.      Narrative:       Per Hospital Policy: Only change  "Specimen Src: to \"Line\" if  specified by physician order.    URINALYSIS [[id number]]  (Abnormal) Collected:  04/14/18 0941    Order Status:  Completed Specimen:  Urine Updated:  04/14/18 1001     Micro Urine Req Microscopic     Color Yellow     Character Clear     Ph 5.0     Glucose Negative mg/dL      Ketones Negative mg/dL      Protein Negative mg/dL      Bilirubin Negative     Urobilinogen, Urine 0.2     Nitrite Negative     Leukocyte Esterase Negative     Occult Blood Trace (A)    Narrative:       Indication for culture:->Emergency Room Patient    BLOOD CULTURE [[id number]] Collected:  04/14/18 0000    Order Status:  Canceled Specimen:  Other from Peripheral           Plan:   Urine culture positive for Streptococcus agalactiae.  No signs or symptoms of urinary tract infection were endorsed by patient, this likely represents contamination.  No antibiotic therapy was prescribed or is required at this time.    Stu Aleman, PharmD, BCPS    "

## 2018-04-18 NOTE — PROGRESS NOTES
Patient is a direct admit from Dr Fuentes's office.   Dr Fuentes has written orders. Orders faxed to [facility] ([phone]).   ADT signed and held, needs to be released when the patient arrives on the unit.

## 2018-04-19 ENCOUNTER — APPOINTMENT (OUTPATIENT)
Dept: RADIOLOGY | Facility: MEDICAL CENTER | Age: 48
End: 2018-04-19
Attending: EMERGENCY MEDICINE
Payer: COMMERCIAL

## 2018-04-19 ENCOUNTER — PATIENT OUTREACH (OUTPATIENT)
Dept: HEALTH INFORMATION MANAGEMENT | Facility: OTHER | Age: 48
End: 2018-04-19

## 2018-04-19 ENCOUNTER — HOSPITAL ENCOUNTER (EMERGENCY)
Facility: MEDICAL CENTER | Age: 48
End: 2018-04-20
Attending: EMERGENCY MEDICINE
Payer: COMMERCIAL

## 2018-04-19 DIAGNOSIS — N10 ACUTE PYELONEPHRITIS: ICD-10-CM

## 2018-04-19 DIAGNOSIS — G89.29 CHRONIC RIGHT-SIDED LOW BACK PAIN WITHOUT SCIATICA: ICD-10-CM

## 2018-04-19 DIAGNOSIS — F32.A DEPRESSION, UNSPECIFIED DEPRESSION TYPE: ICD-10-CM

## 2018-04-19 DIAGNOSIS — M54.50 CHRONIC RIGHT-SIDED LOW BACK PAIN WITHOUT SCIATICA: ICD-10-CM

## 2018-04-19 DIAGNOSIS — C64.1 CLEAR CELL RENAL CELL CARCINOMA, RIGHT (HCC): ICD-10-CM

## 2018-04-19 DIAGNOSIS — F41.9 ANXIETY: ICD-10-CM

## 2018-04-19 LAB
ALBUMIN SERPL BCP-MCNC: 2.6 G/DL (ref 3.2–4.9)
ALBUMIN SERPL BCP-MCNC: 3.1 G/DL (ref 3.2–4.9)
ALBUMIN/GLOB SERPL: 0.8 G/DL
ALBUMIN/GLOB SERPL: 1 G/DL
ALP SERPL-CCNC: 145 U/L (ref 30–99)
ALP SERPL-CCNC: 181 U/L (ref 30–99)
ALT SERPL-CCNC: 22 U/L (ref 2–50)
ALT SERPL-CCNC: 25 U/L (ref 2–50)
ANION GAP SERPL CALC-SCNC: 10 MMOL/L (ref 0–11.9)
ANION GAP SERPL CALC-SCNC: 9 MMOL/L (ref 0–11.9)
AST SERPL-CCNC: 25 U/L (ref 12–45)
AST SERPL-CCNC: 29 U/L (ref 12–45)
BACTERIA BLD CULT: NORMAL
BASOPHILS # BLD AUTO: 0 % (ref 0–1.8)
BASOPHILS # BLD: 0 K/UL (ref 0–0.12)
BILIRUB SERPL-MCNC: 0.5 MG/DL (ref 0.1–1.5)
BILIRUB SERPL-MCNC: 0.7 MG/DL (ref 0.1–1.5)
BNP SERPL-MCNC: 2872 PG/ML (ref 0–100)
BUN SERPL-MCNC: 7 MG/DL (ref 8–22)
BUN SERPL-MCNC: 8 MG/DL (ref 8–22)
CALCIUM SERPL-MCNC: 7.8 MG/DL (ref 8.5–10.5)
CALCIUM SERPL-MCNC: 8.9 MG/DL (ref 8.5–10.5)
CHLORIDE SERPL-SCNC: 101 MMOL/L (ref 96–112)
CHLORIDE SERPL-SCNC: 101 MMOL/L (ref 96–112)
CO2 SERPL-SCNC: 24 MMOL/L (ref 20–33)
CO2 SERPL-SCNC: 26 MMOL/L (ref 20–33)
CREAT SERPL-MCNC: 1.05 MG/DL (ref 0.5–1.4)
CREAT SERPL-MCNC: 1.16 MG/DL (ref 0.5–1.4)
EOSINOPHIL # BLD AUTO: 0.05 K/UL (ref 0–0.51)
EOSINOPHIL NFR BLD: 1.3 % (ref 0–6.9)
ERYTHROCYTE [DISTWIDTH] IN BLOOD BY AUTOMATED COUNT: 54.9 FL (ref 35.9–50)
GLOBULIN SER CALC-MCNC: 2.7 G/DL (ref 1.9–3.5)
GLOBULIN SER CALC-MCNC: 3.8 G/DL (ref 1.9–3.5)
GLUCOSE SERPL-MCNC: 132 MG/DL (ref 65–99)
GLUCOSE SERPL-MCNC: 233 MG/DL (ref 65–99)
HCT VFR BLD AUTO: 30.1 % (ref 37–47)
HGB BLD-MCNC: 9.8 G/DL (ref 12–16)
IMM GRANULOCYTES # BLD AUTO: 0.01 K/UL (ref 0–0.11)
IMM GRANULOCYTES NFR BLD AUTO: 0.3 % (ref 0–0.9)
LYMPHOCYTES # BLD AUTO: 1.02 K/UL (ref 1–4.8)
LYMPHOCYTES NFR BLD: 26.2 % (ref 22–41)
MCH RBC QN AUTO: 30 PG (ref 27–33)
MCHC RBC AUTO-ENTMCNC: 33.1 G/DL (ref 33.6–35)
MCV RBC AUTO: 90.6 FL (ref 81.4–97.8)
MONOCYTES # BLD AUTO: 0.32 K/UL (ref 0–0.85)
MONOCYTES NFR BLD AUTO: 8.2 % (ref 0–13.4)
NEUTROPHILS # BLD AUTO: 2.49 K/UL (ref 2–7.15)
NEUTROPHILS NFR BLD: 64 % (ref 44–72)
NRBC # BLD AUTO: 0 K/UL
NRBC BLD-RTO: 0 /100 WBC
PLATELET # BLD AUTO: 126 K/UL (ref 164–446)
PMV BLD AUTO: 10 FL (ref 9–12.9)
POTASSIUM SERPL-SCNC: 3.8 MMOL/L (ref 3.6–5.5)
POTASSIUM SERPL-SCNC: 4.3 MMOL/L (ref 3.6–5.5)
PROT SERPL-MCNC: 5.3 G/DL (ref 6–8.2)
PROT SERPL-MCNC: 6.9 G/DL (ref 6–8.2)
RBC # BLD AUTO: 3.3 M/UL (ref 4.2–5.4)
SIGNIFICANT IND 70042: NORMAL
SITE SITE: NORMAL
SODIUM SERPL-SCNC: 135 MMOL/L (ref 135–145)
SODIUM SERPL-SCNC: 136 MMOL/L (ref 135–145)
SOURCE SOURCE: NORMAL
TROPONIN I SERPL-MCNC: <0.01 NG/ML (ref 0–0.04)
WBC # BLD AUTO: 3.9 K/UL (ref 4.8–10.8)

## 2018-04-19 PROCEDURE — 87186 SC STD MICRODIL/AGAR DIL: CPT

## 2018-04-19 PROCEDURE — 99285 EMERGENCY DEPT VISIT HI MDM: CPT

## 2018-04-19 PROCEDURE — 72100 X-RAY EXAM L-S SPINE 2/3 VWS: CPT

## 2018-04-19 PROCEDURE — 87077 CULTURE AEROBIC IDENTIFY: CPT

## 2018-04-19 PROCEDURE — 84484 ASSAY OF TROPONIN QUANT: CPT

## 2018-04-19 PROCEDURE — 71045 X-RAY EXAM CHEST 1 VIEW: CPT

## 2018-04-19 PROCEDURE — 36415 COLL VENOUS BLD VENIPUNCTURE: CPT

## 2018-04-19 PROCEDURE — 96375 TX/PRO/DX INJ NEW DRUG ADDON: CPT

## 2018-04-19 PROCEDURE — 81001 URINALYSIS AUTO W/SCOPE: CPT

## 2018-04-19 PROCEDURE — 85025 COMPLETE CBC W/AUTO DIFF WBC: CPT

## 2018-04-19 PROCEDURE — 83880 ASSAY OF NATRIURETIC PEPTIDE: CPT

## 2018-04-19 PROCEDURE — 700111 HCHG RX REV CODE 636 W/ 250 OVERRIDE (IP): Performed by: EMERGENCY MEDICINE

## 2018-04-19 PROCEDURE — 304561 HCHG STAT O2

## 2018-04-19 PROCEDURE — 80053 COMPREHEN METABOLIC PANEL: CPT

## 2018-04-19 PROCEDURE — 72070 X-RAY EXAM THORAC SPINE 2VWS: CPT

## 2018-04-19 PROCEDURE — 87086 URINE CULTURE/COLONY COUNT: CPT

## 2018-04-19 PROCEDURE — 96374 THER/PROPH/DIAG INJ IV PUSH: CPT

## 2018-04-19 PROCEDURE — 93005 ELECTROCARDIOGRAM TRACING: CPT | Performed by: EMERGENCY MEDICINE

## 2018-04-19 RX ORDER — MORPHINE SULFATE 10 MG/ML
8 INJECTION, SOLUTION INTRAMUSCULAR; INTRAVENOUS ONCE
Status: COMPLETED | OUTPATIENT
Start: 2018-04-19 | End: 2018-04-19

## 2018-04-19 RX ORDER — DIPHENHYDRAMINE HYDROCHLORIDE 50 MG/ML
25 INJECTION INTRAMUSCULAR; INTRAVENOUS ONCE
Status: COMPLETED | OUTPATIENT
Start: 2018-04-19 | End: 2018-04-19

## 2018-04-19 RX ORDER — ONDANSETRON 2 MG/ML
4 INJECTION INTRAMUSCULAR; INTRAVENOUS ONCE
Status: DISCONTINUED | OUTPATIENT
Start: 2018-04-19 | End: 2018-04-19

## 2018-04-19 RX ADMIN — MORPHINE SULFATE 8 MG: 10 INJECTION INTRAVENOUS at 22:32

## 2018-04-19 RX ADMIN — DIPHENHYDRAMINE HYDROCHLORIDE 25 MG: 50 INJECTION, SOLUTION INTRAMUSCULAR; INTRAVENOUS at 22:32

## 2018-04-19 ASSESSMENT — PAIN SCALES - GENERAL
PAINLEVEL_OUTOF10: 8
PAINLEVEL_OUTOF10: 3

## 2018-04-19 ASSESSMENT — LIFESTYLE VARIABLES: DO YOU DRINK ALCOHOL: NO

## 2018-04-19 NOTE — PROGRESS NOTES
2 units PRBC transfusion completed.  Tolerated well by pt, no reactions noted. Pt to discharge post BT per MD. PIV discontinued. Discharge teaching and instructions and given, pt and family verbalized understanding. All pt belongings sent with patient. Discharged home in stable condition, VS within normal limits. Pt wheeled to lobby via wheelchair escorted by CDU staff.

## 2018-04-19 NOTE — PROGRESS NOTES
Received pt in bed awake, daughter at bedside. Pt AOx4, for Blood Transfusion 2 units PRBC. Consent verified, 2 RN verification w/ RN Katya.

## 2018-04-19 NOTE — PROGRESS NOTES
VS monitored closely. No blood transfusion reactions noted. Patient tolerating BT well. Will continue to monitor.

## 2018-04-19 NOTE — DISCHARGE INSTRUCTIONS
Discharge Instructions    Discharged to home by car with relative. Discharged via wheelchair, hospital escort: Yes.  Special equipment needed: Not Applicable    Be sure to schedule a follow-up appointment with your primary care doctor or any specialists as instructed.     Discharge Plan:   Diet Plan: Discussed  Activity Level: Discussed  Confirmed Follow up Appointment: Patient to Call and Schedule Appointment  Confirmed Symptoms Management: Discussed  Medication Reconciliation Updated: Yes  Influenza Vaccine Indication: Patient Refuses    I understand that a diet low in cholesterol, fat, and sodium is recommended for good health. Unless I have been given specific instructions below for another diet, I accept this instruction as my diet prescription.   Other diet: Regular    Special Instructions: None    · Is patient discharged on Warfarin / Coumadin?   No       Depression / Suicide Risk    As you are discharged from this [facility] facility, it is important to learn how to keep safe from harming yourself.    Recognize the warning signs:  · Abrupt changes in personality, positive or negative- including increase in energy   · Giving away possessions  · Change in eating patterns- significant weight changes-  positive or negative  · Change in sleeping patterns- unable to sleep or sleeping all the time   · Unwillingness or inability to communicate  · Depression  · Unusual sadness, discouragement and loneliness  · Talk of wanting to die  · Neglect of personal appearance   · Rebelliousness- reckless behavior  · Withdrawal from people/activities they love  · Confusion- inability to concentrate     If you or a loved one observes any of these behaviors or has concerns about self-harm, here's what you can do:  · Talk about it- your feelings and reasons for harming yourself  · Remove any means that you might use to hurt yourself (examples: pills, rope, extension cords, firearm)  · Get professional help from the community  (Mental Health, Substance Abuse, psychological counseling)  · Do not be alone:Call your Safe Contact- someone whom you trust who will be there for you.  · Call your local CRISIS HOTLINE 600-8647 or 898-158-9654  · Call your local Children's Mobile Crisis Response Team [place] (483) 673-2644 or www.[employer]  · Call the toll free National Suicide Prevention Hotlines   · National Suicide Prevention Lifeline 701-833-AGOK (2342)  · National [employer] Line Network 800-SUICIDE (380-1687)        Transfusión de nasreen   (Blood Transfusion)    Tarik transfusión de nasreen es un procedimiento en el cual se recibe nasreen a través de tarik vía intravenosa. Puede necesitar tarik transfusión de nasreen por tarik enfermedad, cirugía o lesión. La nasreen puede provenir de un donante o puede ser cox propia nasreen donada previamente.  La nasreen administrada en tarik transfusión se compone de diferentes tipos de células. Puede recibir lo siguiente:  · Glóbulos rojos. Estos transportan oxígeno y reemplazan la nasreen perdida.  · Plaquetas. Estas controlan el sangrado.  · Plasma. [place] ayuda a la coagulación sanguínea.  Si tiene hemofilia u otro trastorno de coagulación, también puede recibir otro tipo de hemoderivados.  INFORME A COX MÉDICO:  · Cualquier alergia que tenga.  · Todos los medicamentos que utiliza, incluidos vitaminas, hierbas, gotas oftálmicas, cremas y medicamentos de venta amber.  · Problemas previos que usted o los miembros de cox sheri hayan tenido con el uso de anestésicos.  · Enfermedades de la nasreen que tenga.  · Si tiene cirugías previas.  · Si tiene alguna enfermedad.  · Cualquier reacción previa que haya tenido hans tarik transfusión sanguínea.    RIESGOS Y COMPLICACIONES  En general, se trata de un procedimiento seguro. Sin embargo, pueden presentarse problemas, por ejemplo:  · Tarik reacción alérgica a algún componente de la nasreen donada.  · Fiebre. Esta puede ser tarik reacción a los glóbulos blancos de la nasreen  transfundida.  · Sobrecarga de lila. [place] puede suceder por frantz recibido muchas transfusiones.  · Lesión pulmonar aguda relacionada con la transfusión (LPART). Esta es tarik reacción poco frecuente que causa daño pulmonar. La causa no se conoce. Esta lesión puede ocurrir unas horas o varios días después de la transfusión.  · Reacciones hemolíticas repentinas (agudas) o lentas. [place] sucede si la nasreen no es compatible con las células de la transfusión. El sistema de defensa del cuerpo (sistema inmunitario) puede tratar de atacar a las células nuevas. Esta complicación es poco frecuente.  · Infección. [place] es raro.  ANTES DEL PROCEDIMIENTO  · Es posible que le realicen un análisis de nasreen para determinar cox tipo de nasreen. [place] es necesario para saber qué clase de nasreen aceptará el cuerpo.  · Si planifica someterse a tarik cirugía, puede donar cox propia nasreen. [place] es en tee de que necesite tarik transfusión.  · Si tuvo tarik reacción alérgica a tarik transfusión en el pasado, maximino vez le administren un medicamento que ayude a evitarla. [place] belen medicamento solamente jaison se lo haya indicado el médico.  · Le controlarán la temperatura, la presión arterial y el pulso antes de la transfusión.  PROCEDIMIENTO   · Le insertarán tarik vía intravenosa en el brazo o la mano.  · La bolsa de nasreen donada se conectará a la vía intravenosa y se le administrará en la vena.  · Le controlarán con frecuencia la temperatura, la presión arterial y el pulso hans de la transfusión. [place]en control se realiza para detectar signos tempranos de tarik reacción a la transfusión.  · Si tiene signos o síntomas de tarik reacción, se suspenderá la transfusión y maximino vez le administren un medicamento.  · Cuando finalice la transfusión, le retirarán la vía intravenosa.  · Se puede aplicar presión en el lugar donde se colocó la vía intravenosa.  · Le colocarán tarik venda (vendaje).  Belen procedimiento puede variar según el médico y el hospital.  DESPUÉS  DEL PROCEDIMIENTO  · Le controlarán la presión arterial, la temperatura y el pulso periódicamente.  Esta información no tiene jaison fin reemplazar el consejo del médico. Asegúrese de hacerle al médico cualquier pregunta que tenga.  Document Released: 12/18/2006 Document Revised: 01/08/2016  Elsevier Interactive Patient Education © 2017 [employer] Inc[employer]

## 2018-04-20 ENCOUNTER — APPOINTMENT (OUTPATIENT)
Dept: RADIOLOGY | Facility: MEDICAL CENTER | Age: 48
End: 2018-04-20
Attending: EMERGENCY MEDICINE
Payer: COMMERCIAL

## 2018-04-20 VITALS
HEIGHT: 63 IN | SYSTOLIC BLOOD PRESSURE: 138 MMHG | WEIGHT: 230 LBS | TEMPERATURE: 97.3 F | DIASTOLIC BLOOD PRESSURE: 69 MMHG | RESPIRATION RATE: 18 BRPM | OXYGEN SATURATION: 97 % | BODY MASS INDEX: 40.75 KG/M2 | HEART RATE: 73 BPM

## 2018-04-20 LAB
APPEARANCE UR: ABNORMAL
BACTERIA #/AREA URNS HPF: ABNORMAL /HPF
BILIRUB UR QL STRIP.AUTO: NEGATIVE
COLOR UR: ABNORMAL
EKG IMPRESSION: NORMAL
EPI CELLS #/AREA URNS HPF: ABNORMAL /HPF
GLUCOSE UR STRIP.AUTO-MCNC: NEGATIVE MG/DL
HYALINE CASTS #/AREA URNS LPF: ABNORMAL /LPF
KETONES UR STRIP.AUTO-MCNC: ABNORMAL MG/DL
LEUKOCYTE ESTERASE UR QL STRIP.AUTO: ABNORMAL
MICRO URNS: ABNORMAL
NITRITE UR QL STRIP.AUTO: NEGATIVE
PH UR STRIP.AUTO: 6 [PH]
PROT UR QL STRIP: NEGATIVE MG/DL
RBC # URNS HPF: ABNORMAL /HPF
RBC UR QL AUTO: ABNORMAL
SP GR UR STRIP.AUTO: 1.02
UROBILINOGEN UR STRIP.AUTO-MCNC: 1 MG/DL
WBC #/AREA URNS HPF: ABNORMAL /HPF

## 2018-04-20 PROCEDURE — 71275 CT ANGIOGRAPHY CHEST: CPT

## 2018-04-20 PROCEDURE — 700102 HCHG RX REV CODE 250 W/ 637 OVERRIDE(OP): Performed by: EMERGENCY MEDICINE

## 2018-04-20 PROCEDURE — A9270 NON-COVERED ITEM OR SERVICE: HCPCS | Performed by: EMERGENCY MEDICINE

## 2018-04-20 PROCEDURE — 700117 HCHG RX CONTRAST REV CODE 255: Performed by: EMERGENCY MEDICINE

## 2018-04-20 PROCEDURE — 700105 HCHG RX REV CODE 258: Performed by: EMERGENCY MEDICINE

## 2018-04-20 RX ORDER — CEFDINIR 300 MG/1
300 CAPSULE ORAL ONCE
Status: COMPLETED | OUTPATIENT
Start: 2018-04-20 | End: 2018-04-20

## 2018-04-20 RX ORDER — OXYCODONE HYDROCHLORIDE 5 MG/1
10 TABLET ORAL ONCE
Status: COMPLETED | OUTPATIENT
Start: 2018-04-20 | End: 2018-04-20

## 2018-04-20 RX ORDER — ONDANSETRON 4 MG/1
4 TABLET, ORALLY DISINTEGRATING ORAL EVERY 8 HOURS PRN
Qty: 20 TAB | Refills: 0 | Status: SHIPPED | OUTPATIENT
Start: 2018-04-20

## 2018-04-20 RX ORDER — CEFDINIR 300 MG/1
300 CAPSULE ORAL 2 TIMES DAILY
Qty: 20 CAP | Refills: 0 | Status: SHIPPED | OUTPATIENT
Start: 2018-04-20

## 2018-04-20 RX ORDER — SODIUM CHLORIDE 9 MG/ML
1000 INJECTION, SOLUTION INTRAVENOUS ONCE
Status: COMPLETED | OUTPATIENT
Start: 2018-04-20 | End: 2018-04-20

## 2018-04-20 RX ADMIN — SODIUM CHLORIDE 1000 ML: 9 INJECTION, SOLUTION INTRAVENOUS at 01:06

## 2018-04-20 RX ADMIN — IOHEXOL 70 ML: 350 INJECTION, SOLUTION INTRAVENOUS at 00:41

## 2018-04-20 RX ADMIN — CEFDINIR 300 MG: 300 CAPSULE ORAL at 01:10

## 2018-04-20 RX ADMIN — OXYCODONE HYDROCHLORIDE 10 MG: 5 TABLET ORAL at 01:09

## 2018-04-20 NOTE — ED TRIAGE NOTES
SO states pt had blood transfusion yesterday, and now has back pain, n/v. Dc instructions say to be seen if have s/s

## 2018-04-20 NOTE — ED NOTES
tx from triage waiting room via w.   s.o. At the bedside.  Call light in reach.  o2 not needed at this time.  Will reassess as needed.

## 2018-04-20 NOTE — ED NOTES
Cd made for the pt w/ the imaging results.  Pt asst to a w/c and asst out w/ s.o. Improved.  Pain 0/0.

## 2018-04-20 NOTE — DISCHARGE INSTRUCTIONS
You were seen and evaluated in the Emergency Department at [facility] for:     Back pain    You had the following tests and studies:    CT scan, blood tests, xray, ekg are all stable, except you do have evidence of a urine infection.     You received the following medications:    Morphine, cefdinir    You received the following prescriptions:    Cefdinir, an antibiotic to take twice/day for ten days.  Ondansetron, an antinausea medicine you may take up to three times per day  ----------------------------    Please make sure to follow up with:    Your primary doctor and oncology team in 1 day for a recheck, but come right back if you feel ANY worse.    Good luck, and feel better!  ----------------------------    We always encourage patients to return IMMEDIATELY if they have:  Increased or changing pain, passing out, fevers over 100.4 (taken in your mouth or rectally) for more than 2 days, redness or swelling of skin or tissues, feeling like your heart is beating fast, chest pain that is new or worsening, trouble breathing, feeling like your throat is closing up and can not breath, inability to walk, weakness of any part of your body, new dizziness, severe bleeding that won't stop from any part of your body, if you can't eat or drink, or if you have any other concerns.   If you feel worse, please know that you can always return with any questions, concerns, worse symptoms, or you are feeling unsafe. We certainly cannot say for sure that we have ruled out every illness or dangerous disease, but we feel that at this specific time, your exam, tests, and vital signs like heart rate and blood pressure are safe for discharge.     ted fue visto y evaluado en el Departamento de Emergencia de [facility] por:    Dolor de espalda    ted tuvo las siguientes pruebas y estudios:    La tomografía computarizada, los análisis de nasreen, la radiografía y el ekg son estables, excepto que tiene evidencia de tarik  infección en la orina.    Recibió los siguientes medicamentos:    Morfina, cefdinir    Recibió las siguientes recetas:    Cefdinir, un antibiótico para deepak dos veces al día hans veronique días.  Ondansetron, un medicamento contra la náusea que puede deepak hasta giovana veces al día  ----------------------------    Por favor, asegúrese de hacer un seguimiento con:    Cox médico primario y cox equipo de oncología lo revisarán en un día, leatha regrese si usted se siente peor.    ¡[place] suerte y siéntete mejor!  ----------------------------    Siempre alentamos a los pacientes a regresar INMEDIATAMENTE si tienen:  Aumento o cambio del dolor, desmayos, fiebre superior a 100.4 (en la boca o rectal) hans más de 2 días, enrojecimiento o hinchazón de la piel o los tejidos, sensación de que el corazón late con rapidez, dolor de pecho que es nuevo o empeora, problemas respiración, sensación de que cox garganta se está cerrando y no puede respirar, incapacidad para caminar, debilidad de cualquier parte de cox cuerpo, nuevos mareos, sangrado intenso que no se detendrá en ninguna parte de cox cuerpo, si no puede comer o beber, o si tiene alguna otra preocupación.  Si se siente peor, sepa que siempre puede regresar con cualquier pregunta, inquietud, peor síntoma o si se siente inseguro. Ciertamente no podemos decir con certeza que hayamos descartado todas las enfermedades o enfermedades peligrosas, leatha creemos que en belen momento específico, cox examen, exámenes y signos vitales jaison la frecuencia cardíaca y la presión arterial son seguros para el magdaleno.          Dolor de espalda crónico  (Chronic Back Pain)  Cuando el dolor en la espalda dura más de 3 meses, se denomina dolor de espalda crónico. Es posible que se desconozca la causa de esta afección. Algunas causas comunes son las siguientes:  · Desgaste (enfermedad degenerativa) de los huesos, los ligamentos o los discos de la espalda.  · Inflamación y rigidez en la espalda  (artritis).  Las personas que sufren dolor de espalda crónico generalmente atraviesan determinados períodos en los que belen es más intenso (episodios de exacerbación del dolor). Muchas personas pueden aprender a controlar el dolor de espalda con el cuidado en el hogar.  INSTRUCCIONES PARA EL CUIDADO EN EL HOGAR  Esté atento a cualquier cambio en los síntomas. [place] estas medidas para aliviar el dolor:  Actividad   · Evite agacharse y las actividades que agravan el problema.  · No permanezca sentado o de pie en el mismo lugar hans largos períodos.  · Hans el día, descanse hans lapsos breves. [place] le aliviará el dolor. Descansar recostado o de pie suele ser mejor que hacerlo sentado.  · Cuando descanse hans períodos más largos, incorpore alguna actividad suave o ejercicios de elongación entre michelle y otro. [place] ayudará a evitar la rigidez y el dolor.  · Realice actividad física con regularidad. Pregúntele al médico qué actividades son seguras para usted.  · No levante ningún objeto que pese más de 10 libras (4,5 kg). Siempre use las técnicas correctas para levantar objetos, entre ellas:  ¨ Flexionar las rodillas.  ¨ Mantener la carga cerca del cuerpo.  ¨ No torcerse.  Control del dolor   · Si se lo indican, aplique hielo sobre la jeannie dolorida. El médico puede recomendarle que se aplique hielo hans las primeras 24 a 48 horas después del comienzo de un episodio de exacerbación del dolor.  ¨ Ponga el hielo en tarik bolsa plástica.  ¨ Coloque tarik toalla entre la piel y la bolsa de hielo.  ¨ Coloque el hielo hans 20 minutos, 2 a 3 veces por día.  · Después del hielo, aplíquese calor sobre la jeannie afectada con la frecuencia que le haya indicado el médico. Use la juanjo de calor que el médico le recomiende, jaison tarik compresa de calor húmedo o tarik almohadilla térmica.  ¨ Coloque tarik toalla entre la piel y la juanjo de calor.  ¨ Aplique el calor hans 20 o 30 minutos.  ¨ Retire el calor si la piel se le pone de  color sexton brillante. [place] es muy importante si no puede sentir el dolor, el calor o el frío. Puede correr un riesgo mayor de sufrir quemaduras.  · Intente deepak un baño de inmersión con [place].  · [place] los medicamentos de venta amber y los recetados solamente jaison se lo haya indicado el médico.  · Concurra a todas las visitas de control jaison se lo haya indicado el médico. [place] es importante.  SOLICITE ATENCIÓN MÉDICA SI:  · Siente un dolor que no se joss con reposo o medicamentos.  SOLICITE ATENCIÓN MÉDICA DE INMEDIATO SI:  · Siente debilidad o adormecimiento en tarik o ambas piernas, o en michelle o ambos pies.  · Tiene dificultad para controlar la micción o la defecación.  · Tiene náuseas o vómitos.  · Siente dolor en el abdomen.  · Le falta el aire o se desmaya.  Esta información no tiene jaison fin reemplazar el consejo del médico. Asegúrese de hacerle al médico cualquier pregunta que tenga.  Document Released: 12/18/2006 Document Revised: 04/10/2017 Document Reviewed: 06/06/2016  [employer] Interactive Patient Education © 2017 [employer]      Pielonefritis en los adultos  (Pyelonephritis, Adult)  La pielonefritis es tarik infección del riñón. Los riñones son los órganos que ayudan a limpiar la nasreen al eliminar los residuos a través de la orina. Esta infección puede curarse rápidamente o durar mucho tiempo. En la mayoría de los casos, desaparece con el tratamiento y no causa otros problemas.  CUIDADOS EN EL HOGAR  Medicamentos   · [place] los medicamentos de venta amber y los recetados solamente jaison se lo haya indicado el médico.  · [place] el antibiótico jaison se lo indicó cox médico. No deje de deepak los medicamentos aunque comience a sentirse mejor.  Instrucciones generales   · Danuta suficiente líquido para mantener el pis suha o de color amarillo pálido.  · Evite la cafeína, el té y las bebidas gaseosas.  · Orine con frecuencia. Evite retener la orina hans largos períodos.  · Orine antes y después de las  relaciones sexuales.  · Después de defecar, las mujeres deben higienizarse desde adelante hacia atrás. Use cada trozo de papel higiénico solo tarik vez.  · Concurra a todas las visitas de control jaison se lo haya indicado el médico. [place] es importante.  SOLICITE AYUDA SI:  · No mejora luego de 2 días.  · Los síntomas empeoran.  · Tiene fiebre.  SOLICITE AYUDA DE INMEDIATO SI:  · No puede deepak los medicamentos o beber líquidos según las indicaciones.  · Siente escalofríos o comienza a tiritar.  · [place].  · Tiene un dolor muy intenso en el costado (fosa lumbar) o en la espalda.  · Se siente muy débil o se desvanece (se desmaya).  Esta información no tiene jaison fin reemplazar el consejo del médico. Asegúrese de hacerle al médico cualquier pregunta que tenga.  Document Released: 12/18/2006 Document Revised: 09/07/2016 Document Reviewed: 04/11/2016  [employer] Interactive Patient Education © 2017 [employer]      Cáncer de riñón  (Kidney Cancer)  El cáncer de riñón es un crecimiento anormal de las células renales que es canceroso (maligno). A diferencia de los tumores no cancerosos (benignos), los tumores malignos pueden diseminarse a otras partes del cuerpo. Los riñones son órganos que filtran la nasreen y la mantienen limpia. Eliminan las sustancias de desecho de la nasreen y las llevan a la orina. La orina pasa desde los riñones, a través de los uréteres, hacia la vejiga. Al orinar, estas sustancias de desecho salen del cuerpo.  FACTORES DE RIESGO  Existen varios factores de riesgo que pueden aumentar las probabilidades de tener cáncer de riñón, entre los que se incluyen los siguientes:  · La edad. La probabilidad de tener cáncer de riñón aumenta con la edad.  · Antecedentes de cáncer de riñón.  · Ser afroamericano, norteamericano nativo o nativo de [place].  · Fumar.  · Ser varón.  · Obesidad.  · Presión arterial elevada (hipertensión arterial).  · Enfermedad renal avanzada, especialmente la que requiere diálisis renal a  louise plazo.  · Enfermedades hereditarias, jaison enfermedad de von Hippel-[place], esclerosis tuberosa y carcinoma papilar hereditario de células renales.  · Contacto con ciertas sustancias químicas en el lugar de trabajo.  SIGNOS Y SÍNTOMAS  Generalmente, el cáncer de riñón incipiente no causa síntomas. A medida que el cáncer avanza, los síntomas pueden ser los siguientes:  · Nasreen en la orina.  · Dolor de abdomen o de espalda.  · Fatiga.  · Pérdida de peso sin causa aparente.  · Fiebre.  DIAGNÓSTICO  El médico le hará preguntas sobre cox historia clínica y un examen físico. Podrán solicitarle otros estudios, por ejemplo:  · Análisis de nasreen y orina.  · Radiografías.  · Estudios de diagnóstico por imágenes, jaison tomografía computarizada, resonancia magnética y tomografía por emisión de positrones.  · Pielografía intravenosa.  · Angiograma.  · Examen de tarik muestra de tejido (biopsia) del riñón.  Si se confirma el diagnóstico de cáncer de riñón, se lo estadificará para determinar cox gravedad y extensión. La estadificación es la evaluación del tamaño del tumor, si belen se ha diseminado y dónde. Los estadios del cáncer incluyen los siguientes:  · Estadio I. El cáncer se encuentra solamente en el riñón. El tumor puede tener hasta 2¾ pulgadas (7 cm) de ancho.  · Estadio II. El cáncer se encuentra solamente en el riñón. El tumor tiene más de 2¾ pulgadas (7 cm) de ancho.  · Estadio III. El cáncer se [place] diseminado a los tejidos cercanos y posiblemente a los ganglios linfáticos.  · Estadio IV. El cáncer se ha diseminado a los ganglios linfáticos y a otras partes del cuerpo, jaison los huesos, el hígado, el cerebro o los pulmones.  TRATAMIENTO  El tratamiento del cáncer de riñón depende del tipo y el estadio de la enfermedad, y puede incluir michelle o más de los siguientes:  · Cirugía. [place] tratamiento puede abarcar cirugía para extirpar lo siguiente:  ¨ Solo el tumor (cirugía de preservación de la nefrona.  ¨ La totalidad del  riñón (nefrectomía).  ¨ El riñón, parte del tejido maddison que lo rodea y los ganglios linfáticos cercanos, así jaison la glándula suprarrenal en ciertos casos (nefrectomía radical).  · Radioterapia. [place] tratamiento utiliza althea de magdaleno potencia para destruir las células cancerosas.  · Crioablación. [place] tratamiento utiliza tarik aguja que libera gas en las células cancerosas para congelarlas.  · Ablación por radiofrecuencia [place] tratamiento utiliza tarik aguja para emitir ondas de radio de magdaleno potencia que calientan y queman las células cancerosas.  · Embolización arterial. [place] tratamiento usa tarik cánula diminuta que se introduce por tarik arteria inguinal hasta llegar al tumor de riñón. Se inyecta tarik sustancia en la cánula para bloquear la irrigación de nasreen al tumor, lo que causa cox muerte.  · Terapia farmacológica. Se pueden usar medicamentos para ayudar al organismo a combatir el cáncer o detener el crecimiento de las células cancerosas. [place] tratamiento puede incluir quimioterapia, terapia biológica y tratamiento inmunológico.  INSTRUCCIONES PARA EL CUIDADO EN EL HOGAR  · [place] los medicamentos, los suplementos y los maxine a base de hierbas solamente jaison se lo haya indicado el médico.  · Mantener tarik dieta saludable.  · Considere la posibilidad de participar en un carmela de apoyo. [place] puede ayudarlo a aprender a sobrellevar el estrés que implica sufrir cáncer de riñón.  · Busque asesoramiento que lo ayude a manejar los efectos secundarios del tratamiento.  · Concurra a todas las visitas de control, según le indique cox médico. [place] es importante.  SOLICITE ATENCIÓN MÉDICA SI:  · Observa que le salen hematomas o que sangra con facilidad.  · Advierte que baja de peso sin proponérselo.  · Está cada vez más fatigado o débil.  SOLICITE ATENCIÓN MÉDICA DE INMEDIATO SI:  · Observa nasreen en la orina.  · El dolor aumenta repentinamente.  · Tiene fiebre.  · Comienza a sentir falta de aire.  · Siente dolor en el  pecho.  · La piel o los ojos están amarillos.  Esta información no tiene jaison fin reemplazar el consejo del médico. Asegúrese de hacerle al médico cualquier pregunta que tenga.  Document Released: 10/02/2015 Document Revised: 10/02/2015 Document Reviewed: 06/02/2015  [employer] Interactive Patient Education © 2017 [employer]

## 2018-04-20 NOTE — ED PROVIDER NOTES
ED PROVIDER NOTE     Scribed for Keith Land M.D. by Dontrell Cummings. 4/19/2018, 10:14 PM.    CHIEF COMPLAINT  Chief Complaint   Patient presents with   • N/V   • Back Pain     l posterior upper back pain started early this am.  pt is s/p bld transfusion yesterday due to low hgb/hct second to hx of blood ca.       HPI    Primary care provider: Chato Diaz M.D.  Means of arrival: Walk in  History obtained from: Patient  History limited by: None    Eva Opal Castellano is a 47 y.o. female with renal cell carcinoma who presents with chronic back pain that radiates to her chest that worsened in severity at 11 AM today. She endorses shortness of breath secondary to the back pain that is debilitating. The patient additionally reports to be experiencing dysuria, nausea, and vomiting. She was seen in the ED yesterday and received a blood transfusion before discharge. The patient states she felt fine upon discharge, but became concerned when she woke up with the worsening back pain this morning. She is currently prescribed morphine sulfate for her right sided renal cell carcinoma, and is recommended to take the medication every 4 hours for pain management. The patient states she is taking the medication with mild relief, but does not like to take the medication more than 1 time per day. Her  reports her renal cell carcinoma has currently metastasized to her liver as well as her lungs. The patient is currently scheduled for an oncology appointment with [facility] tomorrow. She reports a similar episode of back pain and symptoms after receiving a blood transfusion in the past. She denies fever. No alleviating or exacerbating factors are identified at this time. Many prior episodes. At worst symptoms are severe, at present moderate. They've been constant since around 11 AM this morning and wax and wane in severity.    REVIEW OF SYSTEMS  Constitutional: Negative for fever or chills.  Respiratory:  "Positive for shortness of breath.    Cardiovascular: Positive for chest pain. Negative for syncope or palpitations.  Gastrointestinal: Positive for nausea and vomiting.   Genitourinary: Positive for dysuria. Negative for hematuria.  Musculoskeletal: Positive for back pain right mid and lower back with radiation to her chest.  All other systems reviewed and are negative.  C.    PAST MEDICAL HISTORY   has a past medical history of Anxiety; Bowel habit changes (01/10/2018); Cancer (CMS-HCC) (2017); Depression (03/07/2018); Head ache; Heart burn; Hyperlipidemia; Hypertension; and Snoring (03/07/2018).    PAST FAMILY HISTORY  No pertinent family history noted. Noncontributory.    SOCIAL HISTORY  Social History     Social History Main Topics   • Smoking status: Never Smoker   • Smokeless tobacco: Never Used   • Alcohol use No   • Drug use: No   • Sexual activity: None noted       SURGICAL HISTORY   has a past surgical history that includes bladder sling female (9/30/08); other orthopedic surgery (2008); and nephrectomy robotic (Right, 1/18/2018).    CURRENT MEDICATIONS  Home Medications     Reviewed by Rianna Long R.N. (Registered Nurse) on 04/19/18 at 2105  Med List Status: Partial   Medication Last Dose Status   acetaminophen (TYLENOL) 500 MG Tab 4/17/2018 Active   amlodipine (NORVASC) 5 MG Tab 4/17/2018 Active   ferrous sulfate 325 (65 Fe) MG tablet 4/18/2018 Active   ondansetron (ZOFRAN ODT) 4 MG TABLET DISPERSIBLE 4/13/2018 Active   potassium chloride SA (KDUR) 20 MEQ Tab CR 4/18/2018 Active   vitamin D (VITAMIND D3) 1000 UNIT Tab 4/18/2018 Active                ALLERGIES  Allergies   Allergen Reactions   • Food      Eggs to eat causes stomach ache and rash       PHYSICAL EXAM  VITAL SIGNS: /98   Pulse 79   Temp 36.3 °C (97.3 °F)   Resp 18   Ht 1.6 m (5' 3\")   Wt 104.3 kg (230 lb)   SpO2 94%   BMI 40.74 kg/m²    Pulse ox interpretation: On room air, I interpret this pulse ox as " normal.  Constitutional: Uncomfortable appearing. Mild acute distress.  HEENT: Normocephalic, atraumatic. Posterior pharynx clear, mucous membranes dry.  Eyes:  EOMI. Normal sclera.  Neck: Supple, Full range of motion, nontender.  Chest/Pulmonary: Clear to ausculation bilaterally, no wheezes or rhonchi.  Cardiovascular: Regular rate and rhythm, no murmur.   Abdomen: Soft, nontender, no rebound, guarding, or masses.  Back: Right paraspinal tenderness along T and L spine. No focal CVA tenderness.  Musculoskeletal: No deformity, no edema or calf tenderness. No extremity tenderness.  Neuro: Clear speech, normal coordination, cranial nerves II-XII grossly intact.  Psych: Normal mood and affect.  Skin: No rashes, warm and dry.    DIAGNOSTIC STUDIES / PROCEDURES    LABS & EKG  Results for orders placed or performed during the hospital encounter of 04/19/18   CBC WITH DIFFERENTIAL   Result Value Ref Range    WBC 3.9 (L) 4.8 - 10.8 K/uL    RBC 3.30 (L) 4.20 - 5.40 M/uL    Hemoglobin 9.8 (L) 12.0 - 16.0 g/dL    Hematocrit 30.1 (L) 37.0 - 47.0 %    MCV 90.6 81.4 - 97.8 fL    MCH 30.0 27.0 - 33.0 pg    MCHC 33.1 (L) 33.6 - 35.0 g/dL    RDW 54.9 (H) 35.9 - 50.0 fL    Platelet Count 126 (L) 164 - 446 K/uL    MPV 10.0 9.0 - 12.9 fL    Neutrophils-Polys 64.00 44.00 - 72.00 %    Lymphocytes 26.20 22.00 - 41.00 %    Monocytes 8.20 0.00 - 13.40 %    Eosinophils 1.30 0.00 - 6.90 %    Basophils 0.00 0.00 - 1.80 %    Immature Granulocytes 0.30 0.00 - 0.90 %    Nucleated RBC 0.00 /100 WBC    Neutrophils (Absolute) 2.49 2.00 - 7.15 K/uL    Lymphs (Absolute) 1.02 1.00 - 4.80 K/uL    Monos (Absolute) 0.32 0.00 - 0.85 K/uL    Eos (Absolute) 0.05 0.00 - 0.51 K/uL    Baso (Absolute) 0.00 0.00 - 0.12 K/uL    Immature Granulocytes (abs) 0.01 0.00 - 0.11 K/uL    NRBC (Absolute) 0.00 K/uL   COMP METABOLIC PANEL   Result Value Ref Range    Sodium 136 135 - 145 mmol/L    Potassium 4.3 3.6 - 5.5 mmol/L    Chloride 101 96 - 112 mmol/L    Co2 26 20 - 33  mmol/L    Anion Gap 9.0 0.0 - 11.9    Glucose 132 (H) 65 - 99 mg/dL    Bun 7 (L) 8 - 22 mg/dL    Creatinine 1.05 0.50 - 1.40 mg/dL    Calcium 8.9 8.5 - 10.5 mg/dL    AST(SGOT) 29 12 - 45 U/L    ALT(SGPT) 25 2 - 50 U/L    Alkaline Phosphatase 181 (H) 30 - 99 U/L    Total Bilirubin 0.7 0.1 - 1.5 mg/dL    Albumin 3.1 (L) 3.2 - 4.9 g/dL    Total Protein 6.9 6.0 - 8.2 g/dL    Globulin 3.8 (H) 1.9 - 3.5 g/dL    A-G Ratio 0.8 g/dL   ESTIMATED GFR   Result Value Ref Range    GFR If African American >60 >60 mL/min/1.73 m 2    GFR If Non  56 (A) >60 mL/min/1.73 m 2   URINALYSIS,CULTURE IF INDICATED   Result Value Ref Range    Color DK Yellow     Character Cloudy (A)     Specific Gravity 1.020 <1.035    Ph 6.0 5.0 - 8.0    Glucose Negative Negative mg/dL    Ketones Trace (A) Negative mg/dL    Protein Negative Negative mg/dL    Bilirubin Negative Negative    Urobilinogen, Urine 1.0 Negative    Nitrite Negative Negative    Leukocyte Esterase Small (A) Negative    Occult Blood Small (A) Negative    Micro Urine Req Microscopic    BNP   Result Value Ref Range    B Natriuretic Peptide 2872 (H) 0 - 100 pg/mL   TROPONIN   Result Value Ref Range    Troponin I <0.01 0.00 - 0.04 ng/mL   URINE MICROSCOPIC (W/UA)   Result Value Ref Range    WBC 20-50 (A) /hpf    RBC 10-20 (A) /hpf    Bacteria Moderate (A) None /hpf    Epithelial Cells Few /hpf    Hyaline Cast 0-2 /lpf   EKG (ER)   Result Value Ref Range    Report       [facility] Emergency Dept.    Test Date:  2018  Pt Name:    DAISY HERRERA       Department: ER  MRN:        [id number]                      Room:        21  Gender:     Female                       Technician: [id number]  :        1970                   Requested By:KEITH HODGE  Order #:    [id number]                    Reading MD: Keith Hodge MD    Measurements  Intervals                                Axis  Rate:       73                           P:           39  NE:         156                          QRS:        1  QRSD:       86                           T:          13  QT:         380  QTc:        419    Interpretive Statements  SINUS RHYTHM  no STEMI, strain, ordysrhythmia  Compared to ECG 04/12/2018 14:31:04  T-wave abnormality no longer present    Electronically Signed On 4- 5:27:26 PDT by Keith Land MD         RADIOLOGY  CT-CTA CHEST PULMONARY ARTERY W/ RECONS   Final Result      1.  No evidence of pulmonary emboli.   2.  Bibasilar airspace opacities and atelectasis, greater on the right, worse than before. A small right pleural effusion is also present.   3.  Stable pulmonary nodules.            DX-CHEST-PORTABLE (1 VIEW)   Final Result      Minimal bibasilar discoid atelectasis, right greater than left.      DX-LUMBAR SPINE-2 OR 3 VIEWS   Final Result      Unremarkable limited lumbar spine series.      DX-THORACIC SPINE-2 VIEWS   Final Result      Unremarkable thoracic spine.          COURSE & MEDICAL DECISION MAKING    This is a 47 y.o. female who presents with acute on chronic right-sided back pain that radiates to her chest. Some relief with morphine taken 10 hours ago. Afebrile, vital signs stable.    Differential Diagnosis includes but is not limited to:  Chronic pain, transfusion reaction, malignancy, ACS, pyelonephritis, pulmonary embolism, dissection    ED Course:  10:20 PM - Patient seen and examined at bedside. She will be medicated with Benadryl 25 mg and Morphine 8 mg. Ordered BNP, Troponin, Urinalysis, Estimated GFR, CMP, CBC, POCT Urinalysis, DX-Chest, DX-Thoracic Spine, DX-Lumbar, and EKG.    12:18 AM - Reviewed patient's lab and radiology results as shown above. Ordered CT-CTA Chest pulmonary artery to further evaluate. She will receive 1 L NS for kidney protection given CT with contrast and she has a history of a nephrectomy, and she is also clinically slightly dehydrated with dry mucous membranes and poor oral intake today. Labs  at this time are reassuring, she has a stable low white blood cell count but no significant anemia given she recently had a transfusion, electrolytes are within normal limits no severe acidosis, some evidence of infection on urinalysis, BNP is elevated, troponin negative doubt ACS, EKG normal sinus rhythm no STEMI strainer dysrhythmia. X-ray showed no obvious fractures or lytic lesions in the thoracic or lumbar spine.    12:57 AM - Patient reevaluated at bedside. She was informed of lab and radiology results; CTA of the chest is reassuring with no evidence of pulmonary embolism, no other acute life-threatening process identified in the thorax. On reevaluation the patient is feeling much better and has normal vital signs and is in no distress. She has moist mucous membranes and has had a positive response to IV fluid rehydration. The patient and  were updated on her workup showing evidence of a urine infection, given she only has one kidney at risk for pyelonephritis plan to treat her with Omnicef. The patient and  prefer to go home that she so that she may follow-up with [facility] for further oncologic specialty care tomorrow. At this time her vitals are stable and her pain is well controlled, I'm concerned that she is not fully compliant with opioid therapy and she does have a real reason for chronic pain. I instructed her to follow this intake and take pain medicine as needed, but be sure to return immediately for any new or worsening symptoms. The patient and  understand, agree with, and appreciated the plan of care. I trust they will heed my advice and come right back if she gets any worse.    Medications   morphine (pf) 10 mg/ml 10 MG/ML injection 8 mg (8 mg Intravenous Given 4/19/18 2232)   diphenhydrAMINE (BENADRYL) injection 25 mg (25 mg Intravenous Given 4/19/18 2232)   NS infusion 1,000 mL (0 mL Intravenous Stopped 4/20/18 0149)   iohexol (OMNIPAQUE) 350 mg/mL (70 mL Intravenous Given  4/20/18 0041)   oxyCODONE immediate-release (ROXICODONE) tablet 10 mg (10 mg Oral Given 4/20/18 0109)   cefdinir (OMNICEF) capsule 300 mg (300 mg Oral Given 4/20/18 0110)       FINAL IMPRESSION  1. Chronic right-sided low back pain without sciatica    2. Clear cell renal cell carcinoma, right (CMS-HCC)    3. Acute pyelonephritis    4. Anxiety    5. Depression, unspecified depression type        PRESCRIPTIONS  Discharge Medication List as of 4/20/2018  2:18 AM      START taking these medications    Details   cefdinir (OMNICEF) 300 MG Cap Take 1 Cap by mouth 2 times a day., Disp-20 Cap, R-0, Print Rx Paper      ondansetron (ZOFRAN ODT) 4 MG TABLET DISPERSIBLE Take 1 Tab by mouth every 8 hours as needed for Nausea., Disp-20 Tab, R-0, Print Rx Paper           FOLLOW UP  Chato Diaz M.D.  [address] [id number] [phone]    Schedule an appointment as soon as possible for a visit in 1 day      [facility], Emergency Dept  [address] [facility] [address] [phone]  Today  If symptoms worsen    -DISCHARGE-    The patient is referred to her oncologist, as well as to her primary physician for a follow-up of today's complaint, as well as blood pressure management, diabetic screening, chronic pain management, and for all other preventative health concerns.     Pertinent Labs & Imaging studies reviewed and verified by myself, as well as nursing notes and the patient's past medical, family, and social histories (See chart for details).    Results, exam findings, clinical impression, presumed diagnosis, treatment options, and strict return precautions were discussed with the patient and family, and they verbalized understanding, agreed with, and appreciated the plan of care.    Dontrell DOLAN), am scribing for, and in the presence of, Keith Land M.D..    Electronically signed by: Dontrell Green), 4/19/2018    Keith DOLAN M.D.  personally performed the services described in this documentation, as scribed by Dontrell Cummings in my presence, and it is both accurate and complete.    Portions of this record were made with voice recognition software.  Despite my review, spelling/grammar/context errors may still remain.  Interpretation of this chart should be taken in this context.    The note accurately reflects work and decisions made by me.  Keith Land  4/20/2018  5:31 AM

## 2018-04-21 ENCOUNTER — PATIENT OUTREACH (OUTPATIENT)
Dept: HEALTH INFORMATION MANAGEMENT | Facility: OTHER | Age: 48
End: 2018-04-21

## 2018-04-24 ENCOUNTER — HOSPITAL ENCOUNTER (OUTPATIENT)
Dept: LAB | Facility: MEDICAL CENTER | Age: 48
End: 2018-04-24
Attending: INTERNAL MEDICINE
Payer: COMMERCIAL

## 2018-04-24 PROCEDURE — 80053 COMPREHEN METABOLIC PANEL: CPT

## 2018-04-24 PROCEDURE — 84436 ASSAY OF TOTAL THYROXINE: CPT

## 2018-04-24 PROCEDURE — 84443 ASSAY THYROID STIM HORMONE: CPT

## 2018-04-24 NOTE — ED NOTES
ED Positive Culture Follow-up/Notification Note:    Date: 4/24/18     Patient seen in the ED on 4/19/2018 for sx of dysuria, nausea, vomiting and low back pain for 12 hours prior to ED arrival.   PMH significant for renal cell carcinoma with mets to lungs and history of nephrectomy x 1.  Recent admission on 4/14 urine culture grew GBS but patient did not report any UTI symptoms.    1. Chronic right-sided low back pain without sciatica    2. Clear cell renal cell carcinoma, right (CMS-HCC)    3. Acute pyelonephritis    4. Anxiety    5. Depression, unspecified depression type       Discharge Medication List as of 4/20/2018  2:18 AM      START taking these medications    Details   cefdinir (OMNICEF) 300 MG Cap Take 1 Cap by mouth 2 times a day., Disp-20 Cap, R-0, Print Rx Paper      ondansetron (ZOFRAN ODT) 4 MG TABLET DISPERSIBLE Take 1 Tab by mouth every 8 hours as needed for Nausea., Disp-20 Tab, R-0, Print Rx Paper             Allergies: Food     Vitals:    04/19/18 2349 04/20/18 0000 04/20/18 0100 04/20/18 0130   BP: 138/69      Pulse: 75 75 94 73   Resp:       Temp:       SpO2: 97% 99% 94% 97%   Weight:       Height:           Final cultures:   Results     Procedure Component Value Units Date/Time    URINE CULTURE(NEW) [[id number]]  (Abnormal) Collected:  04/19/18 2342    Order Status:  Completed Specimen:  Urine Updated:  04/22/18 0931     Significant Indicator POS (POS)     Source UR     Site --     Urine Culture Mixed skin efrem >100,000 cfu/mL including (A)      Group D Enterococcus species (A)    URINALYSIS,CULTURE IF INDICATED [[id number]]  (Abnormal) Collected:  04/19/18 2342    Order Status:  Completed Specimen:  Urine Updated:  04/20/18 0020     Color DK Yellow     Character Cloudy (A)     Specific Gravity 1.020     Ph 6.0     Glucose Negative mg/dL      Ketones Trace (A) mg/dL      Protein Negative mg/dL      Bilirubin Negative     Urobilinogen, Urine 1.0     Nitrite Negative     Leukocyte Esterase Small  (A)     Occult Blood Small (A)     Micro Urine Req Microscopic          Assessment/Plan:   Patient urine culture grew mixed skin efrem including Group D enterococcus.  Sensitivities not reported.  Patient prescribed Cefdinir x 10 days for concern of pyelonephritis.  Continuation of antibiotics for concern of pyelonephritis is reasonable.    No further medication management required at this time.    Andrew Weems, Pharm.D.  Pharmacy Practice Resident, PGY1

## 2018-04-25 LAB
ALBUMIN SERPL BCP-MCNC: 2.9 G/DL (ref 3.2–4.9)
ALBUMIN/GLOB SERPL: 0.8 G/DL
ALP SERPL-CCNC: 196 U/L (ref 30–99)
ALT SERPL-CCNC: 18 U/L (ref 2–50)
ANION GAP SERPL CALC-SCNC: 7 MMOL/L (ref 0–11.9)
AST SERPL-CCNC: 38 U/L (ref 12–45)
BILIRUB SERPL-MCNC: 0.5 MG/DL (ref 0.1–1.5)
BUN SERPL-MCNC: 7 MG/DL (ref 8–22)
CALCIUM SERPL-MCNC: 9.3 MG/DL (ref 8.5–10.5)
CHLORIDE SERPL-SCNC: 98 MMOL/L (ref 96–112)
CO2 SERPL-SCNC: 29 MMOL/L (ref 20–33)
CREAT SERPL-MCNC: 1.19 MG/DL (ref 0.5–1.4)
GLOBULIN SER CALC-MCNC: 3.5 G/DL (ref 1.9–3.5)
GLUCOSE SERPL-MCNC: 161 MG/DL (ref 65–99)
POTASSIUM SERPL-SCNC: 4.2 MMOL/L (ref 3.6–5.5)
PROT SERPL-MCNC: 6.4 G/DL (ref 6–8.2)
SODIUM SERPL-SCNC: 134 MMOL/L (ref 135–145)
T4 SERPL-MCNC: 11 UG/DL (ref 4–12)
TSH SERPL DL<=0.005 MIU/L-ACNC: 4.65 UIU/ML (ref 0.38–5.33)

## 2018-04-27 ENCOUNTER — HOSPITAL ENCOUNTER (OUTPATIENT)
Facility: MEDICAL CENTER | Age: 48
End: 2018-04-27
Attending: NURSE PRACTITIONER
Payer: COMMERCIAL

## 2018-04-27 PROCEDURE — 84439 ASSAY OF FREE THYROXINE: CPT

## 2018-04-27 PROCEDURE — 84443 ASSAY THYROID STIM HORMONE: CPT

## 2018-04-27 PROCEDURE — 80074 ACUTE HEPATITIS PANEL: CPT

## 2018-04-27 PROCEDURE — 80053 COMPREHEN METABOLIC PANEL: CPT

## 2018-04-28 LAB
ALBUMIN SERPL BCP-MCNC: 2.9 G/DL (ref 3.2–4.9)
ALBUMIN/GLOB SERPL: 0.7 G/DL
ALP SERPL-CCNC: 234 U/L (ref 30–99)
ALT SERPL-CCNC: 39 U/L (ref 2–50)
ANION GAP SERPL CALC-SCNC: 13 MMOL/L (ref 0–11.9)
AST SERPL-CCNC: 95 U/L (ref 12–45)
BILIRUB SERPL-MCNC: 0.7 MG/DL (ref 0.1–1.5)
BUN SERPL-MCNC: 12 MG/DL (ref 8–22)
CALCIUM SERPL-MCNC: 9.8 MG/DL (ref 8.5–10.5)
CHLORIDE SERPL-SCNC: 95 MMOL/L (ref 96–112)
CO2 SERPL-SCNC: 27 MMOL/L (ref 20–33)
CREAT SERPL-MCNC: 1.49 MG/DL (ref 0.5–1.4)
GLOBULIN SER CALC-MCNC: 3.9 G/DL (ref 1.9–3.5)
GLUCOSE SERPL-MCNC: 137 MG/DL (ref 65–99)
HAV IGM SERPL QL IA: NEGATIVE
HBV CORE IGM SER QL: NEGATIVE
HBV SURFACE AG SER QL: NEGATIVE
HCV AB SER QL: NEGATIVE
POTASSIUM SERPL-SCNC: 4.7 MMOL/L (ref 3.6–5.5)
PROT SERPL-MCNC: 6.8 G/DL (ref 6–8.2)
SODIUM SERPL-SCNC: 135 MMOL/L (ref 135–145)
T4 FREE SERPL-MCNC: 1.31 NG/DL (ref 0.53–1.43)
TSH SERPL DL<=0.005 MIU/L-ACNC: 8.33 UIU/ML (ref 0.38–5.33)

## 2019-09-10 NOTE — TELEPHONE ENCOUNTER
Last seen: 11/22/2017 by Dr. Salazar  Next appt: 12/16/2017 with Dr. Salazar     Was the patient seen in the last year in this department? Yes   Does patient have an active prescription for medications requested? No   Received Request Via: Patient      Detail Level: Generalized Detail Level: Detailed Detail Level: Zone

## (undated) DEVICE — CLIP LG INTNL HRZN TI ESCP LGT - (20/BX)

## (undated) DEVICE — RESERVOIR SUCTION 100 CC - SILICONE (20EA/CA)

## (undated) DEVICE — BLANKET WARMING LOWER BODY - (10/CA) INACTIVE USE #8585

## (undated) DEVICE — OBTURATOR 8MM BLADELESS - (24EA/BX) DA VINCI

## (undated) DEVICE — CLIP HEM-O-LOC GREEN - (14EA/BX)

## (undated) DEVICE — GOWN SURGEONS X-LARGE - DISP. (30/CA)

## (undated) DEVICE — HEAD HOLDER JUNIOR/ADULT

## (undated) DEVICE — TROCAR 5X100 NON BLADED Z-TH - READ KII (6/BX)

## (undated) DEVICE — GLOVE BIOGEL SZ 6 PF LATEX - (50EA/BX 4BX/CA)

## (undated) DEVICE — SUTURE 4-0 PROLENE V-7 D/A (36PK/BX)

## (undated) DEVICE — LACTATED RINGERS INJ 1000 ML - (14EA/CA 60CA/PF)

## (undated) DEVICE — NEPTUNE 4 PORT MANIFOLD - (20/PK)

## (undated) DEVICE — STAPLE 45MM VASCULAR WHITE 2.5MM (12EA/BX)

## (undated) DEVICE — STAPLER 45MM ARTICULATING - ENDO (3EA/BX)

## (undated) DEVICE — KIT ROOM DECONTAMINATION

## (undated) DEVICE — ARMREST CRADLE FOAM - (2PR/PK 12PR/CA)

## (undated) DEVICE — OBTURATOR 8MM BLADELESS LONG - (24EA/BX) DA VINCI

## (undated) DEVICE — COVER TIP ENDOWRIST HOT SHEAR - (10EA/BX) DA VINCI

## (undated) DEVICE — FORCEP BIPOLAR FENESTRATED - DA VINCI 10X'S REUSABLE

## (undated) DEVICE — TUBE E-T HI-LO CUFF 7.0MM (10EA/PK)

## (undated) DEVICE — BLADE SURGICAL #10 - (50/BX)

## (undated) DEVICE — SYRINGE 30 ML LS (56/BX)

## (undated) DEVICE — MEDICINE CUP STERILE 2 OZ - (100/CA)

## (undated) DEVICE — SUTURE 0 VICRYL PLUS CT-1 - 36 INCH (36/BX)

## (undated) DEVICE — SCISSOR MONOPLR CRV(HOT SHEAR - DA VINCI 10X'S REUSABLE

## (undated) DEVICE — TROCAR Z THREAD12MM OPTICAL - NON BLADED (6/BX)

## (undated) DEVICE — CONTAINER SPECIMEN BAG OR - STERILE 4 OZ W/LID (100EA/CA)

## (undated) DEVICE — DRESSING SURGICAL NUKNIT 6X9 (10EA/BX)

## (undated) DEVICE — GOWN WARMING STANDARD FLEX - (30/CA)

## (undated) DEVICE — CHLORAPREP 26 ML APPLICATOR - ORANGE TINT(25/CA)

## (undated) DEVICE — SUTURE 4-0 PROLENE RB-1 D/A 36 (36PK/BX)"

## (undated) DEVICE — SYRINGE 30 ML LL (56/BX)

## (undated) DEVICE — ROBOTIC SURGERY SERVICES

## (undated) DEVICE — SPONGE XRAY 8X4 STERL. 12PL - (10EA/TY 80TY/CA)

## (undated) DEVICE — CLIP HEMOLOCK PURPLE - (14/BX)

## (undated) DEVICE — CANNULA W/SEAL12X100ZTHREAD - (12/BX)

## (undated) DEVICE — MASK ANESTHESIA ADULT  - (100/CA)

## (undated) DEVICE — SUTURE 3-0 VICRYL PLUS SH - 27 INCH (36/BX)

## (undated) DEVICE — TUBING CLEARLINK DUO-VENT - C-FLO (48EA/CA)

## (undated) DEVICE — BAG RETRIEVAL 12/15 MM INZII (5EA/CA) THIS WILL REPLACE ITEM 75018

## (undated) DEVICE — SET EXTENSION WITH 2 PORTS (48EA/CA) ***PART #2C8610 IS A SUBSTITUTE*****

## (undated) DEVICE — DRAIN JACKSON PRATT 15FR - (10EA/CA)

## (undated) DEVICE — SUTURE 4-0 PROLENE SH 36 (36PK/BX)"

## (undated) DEVICE — CLIP MED LG INTNL HRZN TI ESCP - (20/BX)

## (undated) DEVICE — CLIP MED INTNL HRZN TI ESCP - (25/BX)

## (undated) DEVICE — WATER IRRIG. STER. 1500 ML - (9/CA)

## (undated) DEVICE — GLOVE BIOGEL SZ 7.5 SURGICAL PF LTX - (50PR/BX 4BX/CA)

## (undated) DEVICE — SCISSORS 5MM CVD (6EA/BX)

## (undated) DEVICE — ENDOWRIST PRO GRASP - DA VINCI 10X'S REUSABLE

## (undated) DEVICE — TUBE CONNECT SUCTION CLEAR 120 X 1/4" (50EA/CA)"

## (undated) DEVICE — SUTURE 1 PDS II PLUS TP-1 - (12PK/BX)

## (undated) DEVICE — TROCAR 12 X 150 KII FIOS Z THREAD (6EA/BX)

## (undated) DEVICE — Device

## (undated) DEVICE — SUCTION INSTRUMENT YANKAUER BULBOUS TIP W/O VENT (50EA/CA)

## (undated) DEVICE — DRAPE 4 ARM DA VANCI SI - (5EA/CA)

## (undated) DEVICE — SENSOR SPO2 NEO LNCS ADHESIVE (20/BX) SEE USER NOTES

## (undated) DEVICE — SPONGE DRAIN 4 X 4IN 6-PLY - (2/PK25PK/BX12BX/CS)

## (undated) DEVICE — TRAY CATHETER FOLEY URINE METER W/STATLOCK 350ML (10EA/CA)

## (undated) DEVICE — PAD LAP STERILE 18 X 18 - (5/PK 40PK/CA)

## (undated) DEVICE — APPLIER 5MM MED/LARGE CLIP - (3/BX)

## (undated) DEVICE — GLOVE BIOGEL PI INDICATOR SZ 6.5 SURGICAL PF LF - (50/BX 4BX/CA)

## (undated) DEVICE — SUTURE GENERAL

## (undated) DEVICE — PROTECTOR ULNA NERVE - (36PR/CA)

## (undated) DEVICE — SUTURE 2-0 SILK FS 18 (36PK/BX) DISCONTINUED USE #34812, LOWER UOM SAME PART # WITH G AT THE END"

## (undated) DEVICE — KIT ANESTHESIA W/CIRCUIT & 3/LT BAG W/FILTER (20EA/CA)

## (undated) DEVICE — SLEEVE, VASO, THIGH, MED

## (undated) DEVICE — ELECTRODE DUAL RETURN W/ CORD - (50/PK)

## (undated) DEVICE — CANISTER SUCTION 3000ML MECHANICAL FILTER AUTO SHUTOFF MEDI-VAC NONSTERILE LF DISP  (40EA/CA)

## (undated) DEVICE — GLOVE BIOGEL PI ORTHO SZ 7 PF LF (40PR/BX)

## (undated) DEVICE — SET LEADWIRE 5 LEAD BEDSIDE DISPOSABLE ECG (1SET OF 5/EA)

## (undated) DEVICE — ELECTRODE 850 FOAM ADHESIVE - HYDROGEL RADIOTRNSPRNT (50/PK)

## (undated) DEVICE — GLOVE BIOGEL PI ORTHO SZ 6 1/2 SURGICAL PF LF (40PR/BX)

## (undated) DEVICE — SPONGE RADIOPAQUE CTN X-LG - STERILE (50PK/CA) MADE TO ORDER ITEM AND HAS A 4-6 WEEK LEAD TIME

## (undated) DEVICE — SUCTION TIP STRAIGHT ARGYLE - 50EA/CA

## (undated) DEVICE — SUTURE 5-0 PROLENE C-1 D/A 24 (36PK/BX)"

## (undated) DEVICE — BLADE SURGICAL #15 - (50/BX 3BX/CA)

## (undated) DEVICE — TOWELS CLOTH SURGICAL - (4/PK 20PK/CA)

## (undated) DEVICE — SET SUCTION/IRRIGATION WITH DISPOSABLE TIP (6/CA )PART #0250-070-520 IS A SUB

## (undated) DEVICE — BLADE SURGICAL KNIFE #12 - (50/BX)

## (undated) DEVICE — GLOVE BIOGEL INDICATOR SZ 7.5 SURGICAL PF LTX - (50PR/BX 4BX/CA)

## (undated) DEVICE — NEEDLE INSFL 120MM 14GA VRRS - (20/BX)

## (undated) DEVICE — DRAPE LARGE 3 QUARTER - (20/CA)